# Patient Record
Sex: MALE | Race: WHITE | Employment: FULL TIME | URBAN - NONMETROPOLITAN AREA
[De-identification: names, ages, dates, MRNs, and addresses within clinical notes are randomized per-mention and may not be internally consistent; named-entity substitution may affect disease eponyms.]

---

## 2016-06-17 LAB
BASOPHILS ABSOLUTE: ABNORMAL /ΜL
BASOPHILS RELATIVE PERCENT: ABNORMAL %
CHOLESTEROL, FASTING: 127
EOSINOPHILS ABSOLUTE: ABNORMAL /ΜL
EOSINOPHILS RELATIVE PERCENT: ABNORMAL %
HCT VFR BLD CALC: 33.6 % (ref 41–53)
HDLC SERPL-MCNC: 36 MG/DL (ref 35–70)
HEMOGLOBIN: 10.3 G/DL (ref 13.5–17.5)
LDL CHOLESTEROL CALCULATED: 52 MG/DL (ref 0–160)
LYMPHOCYTES ABSOLUTE: ABNORMAL /ΜL
LYMPHOCYTES RELATIVE PERCENT: ABNORMAL %
MCH RBC QN AUTO: ABNORMAL PG
MCHC RBC AUTO-ENTMCNC: ABNORMAL G/DL
MCV RBC AUTO: ABNORMAL FL
MONOCYTES ABSOLUTE: ABNORMAL /ΜL
MONOCYTES RELATIVE PERCENT: ABNORMAL %
NEUTROPHILS ABSOLUTE: ABNORMAL /ΜL
NEUTROPHILS RELATIVE PERCENT: ABNORMAL %
PLATELET # BLD: 388 K/ΜL
PMV BLD AUTO: ABNORMAL FL
RBC # BLD: ABNORMAL 10^6/ΜL
TRIGLYCERIDE, FASTING: 194
WBC # BLD: 6.9 10^3/ML

## 2016-09-17 LAB — GLUCOSE BLD-MCNC: 130 MG/DL

## 2016-09-19 LAB
CHOLESTEROL, TOTAL: 127 MG/DL
CHOLESTEROL/HDL RATIO: 3.5
HDLC SERPL-MCNC: 36 MG/DL (ref 35–70)
LDL CHOLESTEROL CALCULATED: 52 MG/DL (ref 0–160)
TRIGL SERPL-MCNC: 194 MG/DL
VLDLC SERPL CALC-MCNC: 39 MG/DL

## 2017-04-19 LAB
BUN BLDV-MCNC: NORMAL MG/DL
CHLORIDE: NORMAL
CHLORIDE: NORMAL
CREATININE: NORMAL MG/DL
GFR CALCULATED: NORMAL
HBA1C MFR BLD: NORMAL %
POTASSIUM: NORMAL
SODIUM: NORMAL

## 2017-05-10 RX ORDER — LISINOPRIL AND HYDROCHLOROTHIAZIDE 12.5; 1 MG/1; MG/1
1 TABLET ORAL DAILY
COMMUNITY
End: 2017-05-10 | Stop reason: SDUPTHER

## 2017-05-10 RX ORDER — LISINOPRIL AND HYDROCHLOROTHIAZIDE 12.5; 1 MG/1; MG/1
1 TABLET ORAL DAILY
Qty: 30 TABLET | Refills: 5 | Status: SHIPPED | OUTPATIENT
Start: 2017-05-10 | End: 2017-05-24 | Stop reason: SDUPTHER

## 2017-05-10 RX ORDER — LEVOTHYROXINE SODIUM 175 UG/1
175 TABLET ORAL DAILY
Qty: 30 TABLET | Refills: 5 | Status: SHIPPED | OUTPATIENT
Start: 2017-05-10 | End: 2017-05-24 | Stop reason: SDUPTHER

## 2017-05-10 RX ORDER — LEVOTHYROXINE SODIUM 175 UG/1
175 TABLET ORAL DAILY
COMMUNITY
End: 2017-05-10 | Stop reason: SDUPTHER

## 2017-05-19 DIAGNOSIS — E78.6 HDL DEFICIENCY: ICD-10-CM

## 2017-05-19 DIAGNOSIS — Z72.0 TOBACCO ABUSE: ICD-10-CM

## 2017-05-19 PROBLEM — E78.5 HYPERLIPIDEMIA: Status: ACTIVE | Noted: 2017-05-19

## 2017-05-19 PROBLEM — I10 HTN (HYPERTENSION): Status: ACTIVE | Noted: 2017-05-19

## 2017-05-19 PROBLEM — H60.90 OTITIS EXTERNA: Status: ACTIVE | Noted: 2017-05-19

## 2017-05-19 PROBLEM — E03.9 HYPOTHYROID: Status: ACTIVE | Noted: 2017-05-19

## 2017-05-19 PROBLEM — E78.1 HYPERTRIGLYCERIDEMIA: Status: ACTIVE | Noted: 2017-05-19

## 2017-05-19 PROBLEM — E11.9 DIABETES (HCC): Status: ACTIVE | Noted: 2017-05-19

## 2017-05-19 PROBLEM — K59.00 CONSTIPATION: Status: ACTIVE | Noted: 2017-05-19

## 2017-05-19 RX ORDER — PREDNISONE 1 MG/1
1 TABLET ORAL DAILY
COMMUNITY
End: 2019-10-14 | Stop reason: ALTCHOICE

## 2017-05-19 RX ORDER — POLYETHYLENE GLYCOL 3350 17 G/17G
17 POWDER, FOR SOLUTION ORAL DAILY
COMMUNITY
End: 2017-05-24 | Stop reason: ALTCHOICE

## 2017-05-19 RX ORDER — DULOXETIN HYDROCHLORIDE 60 MG/1
60 CAPSULE, DELAYED RELEASE ORAL DAILY
COMMUNITY
End: 2019-10-14 | Stop reason: ALTCHOICE

## 2017-05-19 RX ORDER — ATORVASTATIN CALCIUM 20 MG/1
20 TABLET, FILM COATED ORAL DAILY
COMMUNITY
End: 2017-05-24 | Stop reason: SDUPTHER

## 2017-05-24 ENCOUNTER — OFFICE VISIT (OUTPATIENT)
Dept: FAMILY MEDICINE CLINIC | Age: 62
End: 2017-05-24
Payer: COMMERCIAL

## 2017-05-24 VITALS — WEIGHT: 233 LBS | DIASTOLIC BLOOD PRESSURE: 80 MMHG | HEART RATE: 88 BPM | SYSTOLIC BLOOD PRESSURE: 120 MMHG

## 2017-05-24 DIAGNOSIS — E11.9 TYPE 2 DIABETES MELLITUS WITHOUT COMPLICATION, WITHOUT LONG-TERM CURRENT USE OF INSULIN (HCC): ICD-10-CM

## 2017-05-24 DIAGNOSIS — E03.9 ACQUIRED HYPOTHYROIDISM: ICD-10-CM

## 2017-05-24 DIAGNOSIS — I10 ESSENTIAL HYPERTENSION: Primary | ICD-10-CM

## 2017-05-24 PROCEDURE — 99214 OFFICE O/P EST MOD 30 MIN: CPT | Performed by: FAMILY MEDICINE

## 2017-05-24 RX ORDER — LEVOTHYROXINE SODIUM 175 UG/1
175 TABLET ORAL DAILY
Qty: 90 TABLET | Refills: 1 | Status: SHIPPED | OUTPATIENT
Start: 2017-05-24 | End: 2018-05-10 | Stop reason: SDUPTHER

## 2017-05-24 RX ORDER — LISINOPRIL AND HYDROCHLOROTHIAZIDE 12.5; 1 MG/1; MG/1
1 TABLET ORAL DAILY
Qty: 90 TABLET | Refills: 1 | Status: SHIPPED | OUTPATIENT
Start: 2017-05-24 | End: 2018-05-10 | Stop reason: SDUPTHER

## 2017-05-24 RX ORDER — ATORVASTATIN CALCIUM 20 MG/1
20 TABLET, FILM COATED ORAL DAILY
Qty: 90 TABLET | Refills: 1 | Status: SHIPPED | OUTPATIENT
Start: 2017-05-24 | End: 2018-03-28 | Stop reason: ALTCHOICE

## 2017-05-24 ASSESSMENT — ENCOUNTER SYMPTOMS
DIARRHEA: 0
SHORTNESS OF BREATH: 0
CONSTIPATION: 0

## 2017-05-24 ASSESSMENT — PATIENT HEALTH QUESTIONNAIRE - PHQ9
2. FEELING DOWN, DEPRESSED OR HOPELESS: 0
1. LITTLE INTEREST OR PLEASURE IN DOING THINGS: 0
SUM OF ALL RESPONSES TO PHQ QUESTIONS 1-9: 0
SUM OF ALL RESPONSES TO PHQ9 QUESTIONS 1 & 2: 0

## 2017-11-22 DIAGNOSIS — I10 ESSENTIAL HYPERTENSION: ICD-10-CM

## 2017-11-22 DIAGNOSIS — E03.9 ACQUIRED HYPOTHYROIDISM: ICD-10-CM

## 2017-11-22 DIAGNOSIS — E11.9 TYPE 2 DIABETES MELLITUS WITHOUT COMPLICATION, WITHOUT LONG-TERM CURRENT USE OF INSULIN (HCC): ICD-10-CM

## 2017-11-22 LAB
AGE FOR GFR: 61
ANION GAP SERPL CALCULATED.3IONS-SCNC: 12 MMOL/L
CHLORIDE BLD-SCNC: 102 MMOL/L
CO2: 30 MMOL/L
CREAT SERPL-MCNC: 1.2 MG/DL
CREAT SERPL-MCNC: 1.2 MG/DL
CREATININE, RANDOM: 150.7 MG/DL
EGFR BF: 55 ML/MIN/1.73 M2
EGFR BM: 75 ML/MIN/1.73 M2
EGFR WF: 46 ML/MIN/1.73 M2
EGFR WM: 62 ML/MIN/1.73 M2
MICROALBUMIN UR-MCNC: <0.6 MG/DL
POTASSIUM SERPL-SCNC: 4.8 MMOL/L
SODIUM BLD-SCNC: 139 MMOL/L
TSH SERPL DL<=0.05 MIU/L-ACNC: 1.37 MIU/ML
TSH SERPL DL<=0.05 MIU/L-ACNC: 1.37 UIU/ML

## 2017-11-29 ENCOUNTER — OFFICE VISIT (OUTPATIENT)
Dept: FAMILY MEDICINE CLINIC | Age: 62
End: 2017-11-29
Payer: COMMERCIAL

## 2017-11-29 VITALS — HEART RATE: 84 BPM | DIASTOLIC BLOOD PRESSURE: 70 MMHG | SYSTOLIC BLOOD PRESSURE: 120 MMHG | WEIGHT: 241 LBS

## 2017-11-29 DIAGNOSIS — Z23 NEED FOR VACCINATION: ICD-10-CM

## 2017-11-29 DIAGNOSIS — I10 ESSENTIAL HYPERTENSION: ICD-10-CM

## 2017-11-29 DIAGNOSIS — E11.9 TYPE 2 DIABETES MELLITUS WITHOUT COMPLICATION, WITHOUT LONG-TERM CURRENT USE OF INSULIN (HCC): Primary | ICD-10-CM

## 2017-11-29 DIAGNOSIS — E78.6 HDL DEFICIENCY: ICD-10-CM

## 2017-11-29 DIAGNOSIS — E03.9 ACQUIRED HYPOTHYROIDISM: ICD-10-CM

## 2017-11-29 DIAGNOSIS — Z11.59 NEED FOR HEPATITIS C SCREENING TEST: ICD-10-CM

## 2017-11-29 DIAGNOSIS — Z72.0 TOBACCO ABUSE: ICD-10-CM

## 2017-11-29 LAB — HBA1C MFR BLD: 6.8 %

## 2017-11-29 PROCEDURE — 90686 IIV4 VACC NO PRSV 0.5 ML IM: CPT | Performed by: FAMILY MEDICINE

## 2017-11-29 PROCEDURE — 83036 HEMOGLOBIN GLYCOSYLATED A1C: CPT | Performed by: FAMILY MEDICINE

## 2017-11-29 PROCEDURE — 99214 OFFICE O/P EST MOD 30 MIN: CPT | Performed by: FAMILY MEDICINE

## 2017-11-29 PROCEDURE — 90471 IMMUNIZATION ADMIN: CPT | Performed by: FAMILY MEDICINE

## 2017-11-29 ASSESSMENT — ENCOUNTER SYMPTOMS: SHORTNESS OF BREATH: 1

## 2017-11-29 NOTE — PROGRESS NOTES
[facility]  [address]  Dept: [phone]  Dept Fax: [phone]    Kat Pop is a 65 y.o. male who presents today for his medical conditions/complaints as noted below.   Kat Pop is c/o of Hypertension; Diabetes; and Hyperlipidemia            HPI:     HPI    Here for follow up on HTN, DM, lipid  Not feeling like doing as good as may      BP Readings from Last 3 Encounters:   11/29/17 120/70   05/24/17 120/80          (goal 120/80)    Past Medical History:   Diagnosis Date    Hyperlipidemia     Hypothyroidism     Polymyalgia rheumatica (HCC)     Shingles     Shingles       Past Surgical History:   Procedure Laterality Date    BACK SURGERY      x4    CARPAL TUNNEL RELEASE Bilateral     CATARACT REMOVAL WITH IMPLANT Bilateral 10/2017    COLONOSCOPY      EYE SURGERY  09/2017    Cataract    EYE SURGERY  10/2017    Cataract    HERNIA REPAIR      baby    LAMINECTOMY  08/16/2016    decompression    TONSILLECTOMY      TUMOR REMOVAL      lt arm       Family History   Problem Relation Age of Onset    Cancer Father      brain tumor    Diabetes Sister        Social History   Substance Use Topics    Smoking status: Current Every Day Smoker     Packs/day: 0.50     Types: Cigarettes     Start date: 8/1/2017    Smokeless tobacco: Never Used    Alcohol use Not on file      Current Outpatient Prescriptions   Medication Sig Dispense Refill    levothyroxine (SYNTHROID) 175 MCG tablet Take 1 tablet by mouth Daily 90 tablet 1    lisinopril-hydrochlorothiazide (PRINZIDE;ZESTORETIC) 10-12.5 MG per tablet Take 1 tablet by mouth daily 90 tablet 1    atorvastatin (LIPITOR) 20 MG tablet Take 1 tablet by mouth daily 90 tablet 1    DULoxetine (CYMBALTA) 60 MG extended release capsule Take 60 mg by mouth daily      predniSONE (DELTASONE) 1 MG tablet Take 1 mg by mouth daily 2 tabs       No current facility-administered medications for this visit. Allergies   Allergen Reactions    Morphine      Burning,tingling         Health Maintenance   Topic Date Due    Hepatitis C screen  1955    Diabetic foot exam  01/06/1966    Diabetic retinal exam  01/06/1966    HIV screen  01/06/1971    DTaP/Tdap/Td vaccine (1 - Tdap) 01/06/1975    Zostavax vaccine  01/06/2016    Lipid screen  09/16/2017    Diabetic hemoglobin A1C test  04/19/2018    Diabetic microalbuminuria test  11/22/2018    Colon cancer screen colonoscopy  09/21/2025    Flu vaccine  Completed    Pneumococcal med risk  Completed       Subjective:      Review of Systems   Constitutional: Negative for chills and unexpected weight change (gained). Eyes: Negative for visual disturbance (eye appt in Aug, had cataracts removed in Sept/OCt==ct). Respiratory: Positive for shortness of breath (noticed when walking quickly). Cardiovascular: Positive for leg swelling (at night). Negative for chest pain and palpitations. Genitourinary: Negative for frequency. Neurological: Negative for dizziness. Blood Sugar Checks? no  Medication Compliant? yes  Blood Pressure Checks? Yes, @ 2 times a week    Objective:     /70   Pulse 84   Wt 241 lb (109.3 kg)   Physical Exam   Constitutional: He is oriented to person, place, and time. He appears well-developed and well-nourished. obese   Neck: Neck supple. No thyromegaly present. Cardiovascular: Normal rate. No murmur heard. Pulmonary/Chest: Effort normal. No respiratory distress. He has no wheezes. Abdominal: Bowel sounds are normal.   Musculoskeletal: He exhibits no edema. Lymphadenopathy:     He has no cervical adenopathy. Neurological: He is alert and oriented to person, place, and time. Vitals reviewed. No results found for: LABA1C  No results found for: EAG  Diabetic foot exam - normal strength; intact sensation to 10 gm monofilament; normal pulses, no ulcerations or callouses. Good capillary refill. Toenails unremarkable. Weight gain 8 #   Assessment:      1. Need for vaccination    2. HDL deficiency    3. Essential hypertension    4. Type 2 diabetes mellitus without complication, without long-term current use of insulin ([address]    5. Tobacco abuse    6. Acquired hypothyroidism    7. Need for hepatitis C screening test                     Plan:     There are no Patient Instructions on file for this visit. Orders Placed This Encounter   Procedures    INFLUENZA, QUADV, 3 YRS AND OLDER, IM, PF, PREFILL SYR OR SDV, 0.5ML (FLUZONE QUADV, PF)    Hepatitis C Antibody     Standing Status:   Future     Standing Expiration Date:   11/29/2018    Lipid Panel     Standing Status:   Future     Standing Expiration Date:   11/29/2018     Order Specific Question:   Is Patient Fasting?/# of Hours     Answer:   10-12    Creatinine, Serum     Standing Status:   Future     Standing Expiration Date:   11/29/2018    Electrolyte Panel     Standing Status:   Future     Standing Expiration Date:   11/29/2018    POCT glycosylated hemoglobin (Hb A1C)    HM DIABETES FOOT EXAM     No orders of the defined types were placed in this encounter. Return in about 4 months (around 3/29/2018). 4 month follow up if wt down and sugars better will hold Rx for DM. Pt aware. Discussed use, benefit, and side effects of prescribed medications. All patient questions answered. Pt voiced understanding. Reviewed health maintenance. Instructed to continue current medications, diet and exercise. Patient agreed with treatment plan. Follow up as directed.      Electronically signed by Avery Basilio MD on 11/29/2017

## 2018-03-27 LAB
AGE FOR GFR: 62
ANION GAP SERPL CALCULATED.3IONS-SCNC: 13 MMOL/L
CHLORIDE BLD-SCNC: 101 MMOL/L
CHOLESTEROL/HDL RATIO: 4 RATIO
CHOLESTEROL: 156 MG/DL
CO2: 31 MMOL/L
CREAT SERPL-MCNC: 1 MG/DL
EGFR BF: 68 ML/MIN/1.73 M2
EGFR BM: 92 ML/MIN/1.73 M2
EGFR WF: 56 ML/MIN/1.73 M2
EGFR WM: 76 ML/MIN/1.73 M2
HDL, DIRECT: 39 MG/DL
HEPATITIS C IGG: NORMAL
LDL CHOLESTEROL CALCULATED: 61.8 MG/DL
POTASSIUM SERPL-SCNC: 4.3 MMOL/L
SIGNAL/CUTOFF: NORMAL
SODIUM BLD-SCNC: 141 MMOL/L
TRIGL SERPL-MCNC: 276 MG/DL
VLDLC SERPL CALC-MCNC: 55 MG/DL

## 2018-03-28 ENCOUNTER — OFFICE VISIT (OUTPATIENT)
Dept: FAMILY MEDICINE CLINIC | Age: 63
End: 2018-03-28
Payer: COMMERCIAL

## 2018-03-28 VITALS
HEIGHT: 66 IN | WEIGHT: 234 LBS | BODY MASS INDEX: 37.61 KG/M2 | DIASTOLIC BLOOD PRESSURE: 62 MMHG | SYSTOLIC BLOOD PRESSURE: 128 MMHG

## 2018-03-28 DIAGNOSIS — Z11.59 NEED FOR HEPATITIS C SCREENING TEST: ICD-10-CM

## 2018-03-28 DIAGNOSIS — E78.6 HDL DEFICIENCY: ICD-10-CM

## 2018-03-28 DIAGNOSIS — I10 ESSENTIAL HYPERTENSION: ICD-10-CM

## 2018-03-28 DIAGNOSIS — H61.23 CERUMEN DEBRIS ON TYMPANIC MEMBRANE OF BOTH EARS: ICD-10-CM

## 2018-03-28 DIAGNOSIS — E11.9 TYPE 2 DIABETES MELLITUS WITHOUT COMPLICATION, WITHOUT LONG-TERM CURRENT USE OF INSULIN (HCC): Primary | ICD-10-CM

## 2018-03-28 DIAGNOSIS — E03.9 ACQUIRED HYPOTHYROIDISM: ICD-10-CM

## 2018-03-28 DIAGNOSIS — H91.93 DECREASED HEARING OF BOTH EARS: ICD-10-CM

## 2018-03-28 DIAGNOSIS — I73.9 PERIPHERAL VASCULAR DISEASE (HCC): ICD-10-CM

## 2018-03-28 LAB
ANTIBODY: 0.01
HBA1C MFR BLD: 7.2 %

## 2018-03-28 PROCEDURE — 83036 HEMOGLOBIN GLYCOSYLATED A1C: CPT | Performed by: FAMILY MEDICINE

## 2018-03-28 PROCEDURE — 99214 OFFICE O/P EST MOD 30 MIN: CPT | Performed by: FAMILY MEDICINE

## 2018-03-28 RX ORDER — ROSUVASTATIN CALCIUM 20 MG/1
20 TABLET, COATED ORAL DAILY
Qty: 90 TABLET | Refills: 1 | Status: SHIPPED | OUTPATIENT
Start: 2018-03-28 | End: 2018-11-23 | Stop reason: SDUPTHER

## 2018-03-28 RX ORDER — ASPIRIN 81 MG/1
81 TABLET, CHEWABLE ORAL DAILY
Qty: 30 TABLET | Refills: 3 | Status: SHIPPED | OUTPATIENT
Start: 2018-03-28 | End: 2018-09-06 | Stop reason: SDUPTHER

## 2018-03-28 RX ORDER — CETIRIZINE HYDROCHLORIDE 10 MG/1
10 TABLET ORAL DAILY
COMMUNITY
End: 2019-10-14 | Stop reason: ALTCHOICE

## 2018-03-28 RX ORDER — LORATADINE 10 MG/1
10 TABLET ORAL DAILY
COMMUNITY
End: 2018-08-22 | Stop reason: ALTCHOICE

## 2018-03-28 ASSESSMENT — ENCOUNTER SYMPTOMS: SHORTNESS OF BREATH: 0

## 2018-03-28 NOTE — PROGRESS NOTES
[facility]  [address] [facility] [address]  Dept: [phone]  Dept Fax: [phone]    Christy Matthews is a 59 y.o. male who presents today for his medical conditions/complaints as noted below. Christy Matthews is c/o of 3 Month Follow-Up; Diabetes; and Hypertension            HPI:     HPI   Here for follow up for DM and HTN. Not monitoring closely on diet  Taking medication regularly  No SE's noted    No new concerns.     BP Readings from Last 3 Encounters:   03/28/18 128/62   11/29/17 120/70   05/24/17 120/80          (goal 120/80)    Past Medical History:   Diagnosis Date    Hyperlipidemia     Hypothyroidism     Polymyalgia rheumatica (HCC)     Shingles     Shingles       Past Surgical History:   Procedure Laterality Date    BACK SURGERY      x4    CARPAL TUNNEL RELEASE Bilateral     CATARACT REMOVAL WITH IMPLANT Bilateral 10/2017    COLONOSCOPY      EYE SURGERY  09/2017    Cataract    EYE SURGERY  10/2017    Cataract    HERNIA REPAIR      baby    LAMINECTOMY  08/16/2016    decompression    TONSILLECTOMY      TUMOR REMOVAL      lt arm       Family History   Problem Relation Age of Onset    Cancer Father      brain tumor    Diabetes Sister        Social History   Substance Use Topics    Smoking status: Current Every Day Smoker     Packs/day: 0.50     Types: Cigarettes     Start date: 8/1/2017    Smokeless tobacco: Never Used    Alcohol use Not on file      Current Outpatient Prescriptions   Medication Sig Dispense Refill    loratadine (CLEAR-ATADINE) 10 MG tablet Take 10 mg by mouth daily      cetirizine (ZYRTEC) 10 MG tablet Take 10 mg by mouth daily      rosuvastatin (CRESTOR) 20 MG tablet Take 1 tablet by mouth daily 90 tablet 1    aspirin (ASPIRIN CHILDRENS) 81 MG chewable tablet Take 1 tablet by mouth daily 30 tablet 3    levothyroxine (SYNTHROID) 175 MCG tablet Take 1 tablet by mouth Daily 90 tablet 1   

## 2018-04-18 ENCOUNTER — OFFICE VISIT (OUTPATIENT)
Dept: FAMILY MEDICINE CLINIC | Age: 63
End: 2018-04-18
Payer: COMMERCIAL

## 2018-04-18 VITALS
TEMPERATURE: 98.7 F | DIASTOLIC BLOOD PRESSURE: 72 MMHG | HEART RATE: 106 BPM | WEIGHT: 235 LBS | SYSTOLIC BLOOD PRESSURE: 126 MMHG | BODY MASS INDEX: 37.93 KG/M2 | RESPIRATION RATE: 16 BRPM | OXYGEN SATURATION: 95 %

## 2018-04-18 DIAGNOSIS — R10.9 RIGHT FLANK PAIN: Primary | ICD-10-CM

## 2018-04-18 LAB
BILIRUBIN, POC: NORMAL
BLOOD URINE, POC: NORMAL
CLARITY, POC: NORMAL
COLOR, POC: NORMAL
GLUCOSE URINE, POC: 250
KETONES, POC: NORMAL
LEUKOCYTE EST, POC: NORMAL
NITRITE, POC: NORMAL
PH, POC: 6.5
PROTEIN, POC: NORMAL
SPECIFIC GRAVITY, POC: 1.01
UROBILINOGEN, POC: 0.2

## 2018-04-18 PROCEDURE — 81002 URINALYSIS NONAUTO W/O SCOPE: CPT | Performed by: NURSE PRACTITIONER

## 2018-04-18 PROCEDURE — 99214 OFFICE O/P EST MOD 30 MIN: CPT | Performed by: NURSE PRACTITIONER

## 2018-04-18 ASSESSMENT — ENCOUNTER SYMPTOMS
BOWEL INCONTINENCE: 0
ABDOMINAL PAIN: 0

## 2018-04-24 ENCOUNTER — TELEPHONE (OUTPATIENT)
Dept: FAMILY MEDICINE CLINIC | Age: 63
End: 2018-04-24

## 2018-04-24 DIAGNOSIS — R10.11 RIGHT UPPER QUADRANT ABDOMINAL PAIN: ICD-10-CM

## 2018-04-24 DIAGNOSIS — K82.0 CONTRACTION, GALLBLADDER: Primary | ICD-10-CM

## 2018-04-25 ENCOUNTER — TELEPHONE (OUTPATIENT)
Dept: FAMILY MEDICINE CLINIC | Age: 63
End: 2018-04-25

## 2018-04-25 LAB
A/G RATIO: 1.5 RATIO
AGE FOR GFR: 62
ALBUMIN: 3.7 G/DL
ALK PHOSPHATASE: 51 UNITS/L
ALT SERPL-CCNC: 18 UNITS/L
AMYLASE: 69 UNITS/L
ANION GAP SERPL CALCULATED.3IONS-SCNC: 16 MMOL/L
AST SERPL-CCNC: 12 UNITS/L
BASOPHILS # BLD: 0.15 THOU/MM3
BILIRUB SERPL-MCNC: 0.6 MG/DL
BUN BLDV-MCNC: 21 MG/DL
CALCIUM SERPL-MCNC: 9.7 MG/DL
CHLORIDE BLD-SCNC: 102 MMOL/L
CO2: 28 MMOL/L
CREAT SERPL-MCNC: 1 MG/DL
DIFFERENTIAL: AUTOMATED DIFF
EGFR BF: 68 ML/MIN/1.73 M2
EGFR BM: 92 ML/MIN/1.73 M2
EGFR WF: 56 ML/MIN/1.73 M2
EGFR WM: 76 ML/MIN/1.73 M2
EOSINOPHIL # BLD: 0.49 THOU/MM3
GLOBULIN: 2.4 G/DL
GLUCOSE: 161 MG/DL
HCT VFR BLD CALC: 44 %
HEMOGLOBIN: 13.7 G/DL
LIPASE: 389 UNITS/L
LYMPHOCYTES # BLD: 1.8 THOU/MM3
MCH RBC QN AUTO: 28 PG
MCHC RBC AUTO-ENTMCNC: 31.2 G/DL
MCV RBC AUTO: 89.9 FL
MONOCYTES # BLD: 0.45 THOU/MM3
NEUTROPHILS: 4.47 THOU/MM3
PDW BLD-RTO: 13.3 %
PLATELET # BLD: 269 THOU/MM3
PMV BLD AUTO: 9 FL
POTASSIUM SERPL-SCNC: 4.4 MMOL/L
RBC # BLD: 4.9 M/UL
SODIUM BLD-SCNC: 142 MMOL/L
TOTAL PROTEIN: 6.1 G/DL
WBC # BLD: 7.36 THOU/ML3

## 2018-05-01 ENCOUNTER — INITIAL CONSULT (OUTPATIENT)
Dept: SURGERY | Age: 63
End: 2018-05-01
Payer: COMMERCIAL

## 2018-05-01 VITALS
DIASTOLIC BLOOD PRESSURE: 60 MMHG | BODY MASS INDEX: 37.28 KG/M2 | WEIGHT: 232 LBS | TEMPERATURE: 98.2 F | SYSTOLIC BLOOD PRESSURE: 114 MMHG | HEART RATE: 96 BPM | RESPIRATION RATE: 16 BRPM | HEIGHT: 66 IN

## 2018-05-01 DIAGNOSIS — R10.9 RIGHT FLANK PAIN: Primary | ICD-10-CM

## 2018-05-01 PROCEDURE — 99203 OFFICE O/P NEW LOW 30 MIN: CPT | Performed by: SURGERY

## 2018-05-01 RX ORDER — POLYETHYLENE GLYCOL 3350 17 G/17G
17 POWDER, FOR SOLUTION ORAL DAILY
COMMUNITY

## 2018-05-01 RX ORDER — IBUPROFEN 200 MG
200 TABLET ORAL EVERY 6 HOURS PRN
COMMUNITY
End: 2018-08-22 | Stop reason: ALTCHOICE

## 2018-05-01 ASSESSMENT — ENCOUNTER SYMPTOMS
SORE THROAT: 0
VOMITING: 0
SHORTNESS OF BREATH: 0
CONSTIPATION: 0
HEARTBURN: 0
ABDOMINAL PAIN: 0
DIARRHEA: 0
STRIDOR: 0
BACK PAIN: 1
SINUS PAIN: 0
WHEEZING: 0
COUGH: 0
EYES NEGATIVE: 1
NAUSEA: 0

## 2018-05-10 DIAGNOSIS — E03.9 ACQUIRED HYPOTHYROIDISM: ICD-10-CM

## 2018-05-10 DIAGNOSIS — I10 ESSENTIAL HYPERTENSION: ICD-10-CM

## 2018-05-11 RX ORDER — LEVOTHYROXINE SODIUM 175 UG/1
TABLET ORAL
Qty: 90 TABLET | Refills: 1 | Status: SHIPPED | OUTPATIENT
Start: 2018-05-11 | End: 2018-08-22 | Stop reason: DRUGHIGH

## 2018-05-11 RX ORDER — LISINOPRIL AND HYDROCHLOROTHIAZIDE 12.5; 1 MG/1; MG/1
TABLET ORAL
Qty: 90 TABLET | Refills: 1 | Status: SHIPPED | OUTPATIENT
Start: 2018-05-11 | End: 2018-11-04 | Stop reason: SDUPTHER

## 2018-08-21 LAB
A/G RATIO: 1.7 RATIO
AGE FOR GFR: 62
ALBUMIN: 3.8 G/DL
ALK PHOSPHATASE: 52 UNITS/L
ALT SERPL-CCNC: 28 UNITS/L
ANION GAP SERPL CALCULATED.3IONS-SCNC: 10 MMOL/L
AST SERPL-CCNC: 15 UNITS/L
BILIRUB SERPL-MCNC: 0.4 MG/DL
BILIRUBIN DIRECT: 0 MG/DL
CHLORIDE BLD-SCNC: 102 MMOL/L
CHOLESTEROL/HDL RATIO: 3.1 RATIO
CHOLESTEROL: 136 MG/DL
CO2: 32 MMOL/L
CREAT SERPL-MCNC: 1.1 MG/DL
EGFR BF: 61 ML/MIN/1.73 M2
EGFR BM: 82 ML/MIN/1.73 M2
EGFR WF: 50 ML/MIN/1.73 M2
EGFR WM: 68 ML/MIN/1.73 M2
GLOBULIN: 2.3 G/DL
HDL, DIRECT: 44 MG/DL
LDL CHOLESTEROL CALCULATED: 34 MG/DL
POTASSIUM SERPL-SCNC: 4.7 MMOL/L
SODIUM BLD-SCNC: 139 MMOL/L
TOTAL PROTEIN: 6.1 G/DL
TRIGL SERPL-MCNC: 290 MG/DL
TSH SERPL DL<=0.05 MIU/L-ACNC: 7.79 MIU/ML
VLDLC SERPL CALC-MCNC: 58 MG/DL

## 2018-08-22 ENCOUNTER — OFFICE VISIT (OUTPATIENT)
Dept: FAMILY MEDICINE CLINIC | Age: 63
End: 2018-08-22
Payer: COMMERCIAL

## 2018-08-22 VITALS
HEART RATE: 80 BPM | WEIGHT: 234 LBS | DIASTOLIC BLOOD PRESSURE: 70 MMHG | SYSTOLIC BLOOD PRESSURE: 130 MMHG | BODY MASS INDEX: 37.77 KG/M2

## 2018-08-22 DIAGNOSIS — E78.6 HDL DEFICIENCY: ICD-10-CM

## 2018-08-22 DIAGNOSIS — E11.9 TYPE 2 DIABETES MELLITUS WITHOUT COMPLICATION, WITHOUT LONG-TERM CURRENT USE OF INSULIN (HCC): Primary | ICD-10-CM

## 2018-08-22 DIAGNOSIS — I73.9 PERIPHERAL VASCULAR DISEASE (HCC): ICD-10-CM

## 2018-08-22 DIAGNOSIS — Z72.0 TOBACCO ABUSE: ICD-10-CM

## 2018-08-22 DIAGNOSIS — I10 ESSENTIAL HYPERTENSION: ICD-10-CM

## 2018-08-22 DIAGNOSIS — E78.1 HYPERTRIGLYCERIDEMIA: ICD-10-CM

## 2018-08-22 DIAGNOSIS — E03.9 ACQUIRED HYPOTHYROIDISM: ICD-10-CM

## 2018-08-22 LAB — HBA1C MFR BLD: 7.2 %

## 2018-08-22 PROCEDURE — 99214 OFFICE O/P EST MOD 30 MIN: CPT | Performed by: FAMILY MEDICINE

## 2018-08-22 PROCEDURE — 83036 HEMOGLOBIN GLYCOSYLATED A1C: CPT | Performed by: FAMILY MEDICINE

## 2018-08-22 RX ORDER — LEVOTHYROXINE SODIUM 0.2 MG/1
200 TABLET ORAL DAILY
Qty: 90 TABLET | Refills: 1 | Status: SHIPPED | OUTPATIENT
Start: 2018-08-22 | End: 2019-03-10 | Stop reason: SDUPTHER

## 2018-08-22 ASSESSMENT — PATIENT HEALTH QUESTIONNAIRE - PHQ9
SUM OF ALL RESPONSES TO PHQ9 QUESTIONS 1 & 2: 0
SUM OF ALL RESPONSES TO PHQ QUESTIONS 1-9: 0
SUM OF ALL RESPONSES TO PHQ QUESTIONS 1-9: 0
2. FEELING DOWN, DEPRESSED OR HOPELESS: 0
1. LITTLE INTEREST OR PLEASURE IN DOING THINGS: 0

## 2018-08-22 ASSESSMENT — ENCOUNTER SYMPTOMS: SHORTNESS OF BREATH: 0

## 2018-08-22 NOTE — PROGRESS NOTES
Checks? no  Medication Compliant? yes  Blood Pressure Checks? Yes weekly, runs ok    Objective:     /70   Pulse 80   Wt 234 lb (106.1 kg)   BMI 37.77 kg/m²   Physical Exam   Constitutional: He is oriented to person, place, and time. He appears well-developed and well-nourished. No distress. HENT:   Head: Atraumatic. Eyes: Conjunctivae are normal.   Neck: Neck supple. Carotid bruit is not present. No thyromegaly present. Cardiovascular: Normal rate and regular rhythm. No murmur heard. Pulmonary/Chest: Effort normal and breath sounds normal.   Musculoskeletal: He exhibits tenderness ( left upper knee cap only spot tenderness). He exhibits no edema. Right lower leg: He exhibits no swelling. Left lower leg: He exhibits no swelling. Lymphadenopathy:     He has no cervical adenopathy. Neurological: He is alert and oriented to person, place, and time. Vitals reviewed. Lab Results   Component Value Date    LABA1C 7.2 08/22/2018     No results found for: EAG    A1C 7.2 same as prior  Lab Results   Component Value Date     08/21/2018    K 4.7 08/21/2018     08/21/2018    CO2 32 (H) 08/21/2018     Lab Results   Component Value Date    CREATININE 1.1 08/21/2018     Lab Results   Component Value Date    CHOL 136 08/21/2018    CHOL 156 03/27/2018    CHOL 127 09/16/2016     Lab Results   Component Value Date    TRIG 290 (H) 08/21/2018    TRIG 276 (H) 03/27/2018    TRIG 194 09/16/2016     Lab Results   Component Value Date    HDL 36 09/16/2016    HDL 36 06/17/2016     Lab Results   Component Value Date    LDLCALC 34.0 08/21/2018    LDLCALC 61.8 03/27/2018    LDLCALC 52 09/16/2016     Lab Results   Component Value Date    VLDL 58 (H) 08/21/2018    VLDL 55 (H) 03/27/2018    VLDL 39 09/16/2016     Lab Results   Component Value Date    CHOLHDLRATIO 3.1 08/21/2018    CHOLHDLRATIO 4.0 03/27/2018    CHOLHDLRATIO 3.5 09/16/2016   HDL 44 improved from prior.   Lab Results   Component

## 2018-11-04 DIAGNOSIS — I10 ESSENTIAL HYPERTENSION: ICD-10-CM

## 2018-11-05 RX ORDER — LISINOPRIL AND HYDROCHLOROTHIAZIDE 12.5; 1 MG/1; MG/1
TABLET ORAL
Qty: 90 TABLET | Refills: 1 | Status: SHIPPED | OUTPATIENT
Start: 2018-11-05 | End: 2019-05-21 | Stop reason: SDUPTHER

## 2018-11-26 RX ORDER — ROSUVASTATIN CALCIUM 20 MG/1
TABLET, COATED ORAL
Qty: 90 TABLET | Refills: 1 | Status: SHIPPED | OUTPATIENT
Start: 2018-11-26 | End: 2019-06-04 | Stop reason: SDUPTHER

## 2019-05-28 LAB
AGE FOR GFR: 63
ANION GAP SERPL CALCULATED.3IONS-SCNC: 9 MMOL/L
CHLORIDE BLD-SCNC: 106 MMOL/L (ref 98–120)
CHOLESTEROL/HDL RATIO: 4.9 RATIO (ref 0–4.5)
CHOLESTEROL: 163 MG/DL (ref 50–200)
CO2: 27 MMOL/L (ref 22–31)
CREAT SERPL-MCNC: 1 MG/DL (ref 0.7–1.3)
CREATININE, RANDOM: 138.2 MG/DL (ref 20–370)
EGFR BF: 68 ML/MIN/1.73 M2
EGFR BM: 91 ML/MIN/1.73 M2
EGFR WF: 56 ML/MIN/1.73 M2
EGFR WM: 75 ML/MIN/1.73 M2
HBA1C MFR BLD: 7.1 % (ref 4.4–6.4)
HDL, DIRECT: 33 MG/DL (ref 36–68)
LDL CHOLESTEROL CALCULATED: -52 MG/DL
MICROALBUMIN UR-MCNC: 0.6 MG/DL (ref 0–1.7)
MICROALBUMIN/CREAT UR-RTO: 4.3 MCG/MG CR
POTASSIUM SERPL-SCNC: 4.4 MMOL/L (ref 3.6–5)
SODIUM BLD-SCNC: 138 MMOL/L (ref 135–145)
TRIGL SERPL-MCNC: 910 MG/DL (ref 10–250)
TSH SERPL DL<=0.05 MIU/L-ACNC: 0.52 MIU/ML (ref 0.49–4.6)
VLDLC SERPL CALC-MCNC: 182 MG/DL

## 2019-06-04 ENCOUNTER — OFFICE VISIT (OUTPATIENT)
Dept: FAMILY MEDICINE CLINIC | Age: 64
End: 2019-06-04
Payer: COMMERCIAL

## 2019-06-04 VITALS
DIASTOLIC BLOOD PRESSURE: 78 MMHG | HEART RATE: 80 BPM | WEIGHT: 228 LBS | BODY MASS INDEX: 36.64 KG/M2 | HEIGHT: 66 IN | SYSTOLIC BLOOD PRESSURE: 138 MMHG | OXYGEN SATURATION: 97 %

## 2019-06-04 DIAGNOSIS — Z23 NEED FOR TDAP VACCINATION: ICD-10-CM

## 2019-06-04 DIAGNOSIS — I10 ESSENTIAL HYPERTENSION: ICD-10-CM

## 2019-06-04 DIAGNOSIS — E03.9 ACQUIRED HYPOTHYROIDISM: ICD-10-CM

## 2019-06-04 DIAGNOSIS — L91.8 SKIN TAG: ICD-10-CM

## 2019-06-04 DIAGNOSIS — E78.1 HYPERTRIGLYCERIDEMIA: ICD-10-CM

## 2019-06-04 DIAGNOSIS — E11.9 TYPE 2 DIABETES MELLITUS WITHOUT COMPLICATION, WITHOUT LONG-TERM CURRENT USE OF INSULIN (HCC): ICD-10-CM

## 2019-06-04 DIAGNOSIS — E78.6 HDL DEFICIENCY: ICD-10-CM

## 2019-06-04 DIAGNOSIS — I73.9 PERIPHERAL VASCULAR DISEASE (HCC): Primary | ICD-10-CM

## 2019-06-04 PROCEDURE — 99214 OFFICE O/P EST MOD 30 MIN: CPT | Performed by: FAMILY MEDICINE

## 2019-06-04 PROCEDURE — 90715 TDAP VACCINE 7 YRS/> IM: CPT | Performed by: FAMILY MEDICINE

## 2019-06-04 PROCEDURE — 90471 IMMUNIZATION ADMIN: CPT | Performed by: FAMILY MEDICINE

## 2019-06-04 RX ORDER — LISINOPRIL AND HYDROCHLOROTHIAZIDE 12.5; 1 MG/1; MG/1
TABLET ORAL
Qty: 90 TABLET | Refills: 1 | Status: SHIPPED | OUTPATIENT
Start: 2019-06-04 | End: 2019-10-14 | Stop reason: DRUGHIGH

## 2019-06-04 RX ORDER — ROSUVASTATIN CALCIUM 40 MG/1
40 TABLET, COATED ORAL DAILY
Qty: 90 TABLET | Refills: 3 | Status: SHIPPED | OUTPATIENT
Start: 2019-06-04 | End: 2020-05-18

## 2019-06-04 ASSESSMENT — PATIENT HEALTH QUESTIONNAIRE - PHQ9
SUM OF ALL RESPONSES TO PHQ QUESTIONS 1-9: 0
2. FEELING DOWN, DEPRESSED OR HOPELESS: 0
1. LITTLE INTEREST OR PLEASURE IN DOING THINGS: 0
SUM OF ALL RESPONSES TO PHQ QUESTIONS 1-9: 0
SUM OF ALL RESPONSES TO PHQ9 QUESTIONS 1 & 2: 0

## 2019-06-04 ASSESSMENT — ENCOUNTER SYMPTOMS: SHORTNESS OF BREATH: 0

## 2019-06-04 NOTE — PROGRESS NOTES
[address] [facility]  Dept: [phone]  Dept Fax:[phone]    Amberly Skinner is a 62 y.o. male who presents today for his medical conditions/complaints as noted below. Amberly Skinner is c/o of 6 Month Follow-Up; Hypertension; and Medication Refill    HPI:     HPI  Here for follow up of PVD, HTN, DM, Hyperlipidemia and Hypothyroid  Taking all medications regularly  No side effects noted  Noted higher TG, would like to work weight. Had diet <2000 huyen with lots of fruit and limiting red meat.     No other complaint currently    BP Readings from Last 3 Encounters:   06/04/19 138/78   08/22/18 130/70   05/01/18 114/60          (goal 120/80)    Past Medical History:   Diagnosis Date    Hyperlipidemia     Hypothyroidism     Polymyalgia rheumatica (HCC)     Shingles     Shingles       Past Surgical History:   Procedure Laterality Date    BACK SURGERY      x4    CARPAL TUNNEL RELEASE Bilateral     CATARACT REMOVAL WITH IMPLANT Bilateral 10/2017    COLONOSCOPY      EYE SURGERY  09/2017    Cataract    EYE SURGERY  10/2017    Cataract    HERNIA REPAIR      baby    LAMINECTOMY  08/16/2016    decompression    TONSILLECTOMY      TUMOR REMOVAL      lt arm       Family History   Problem Relation Age of Onset   [facility] Cancer Father         brain tumor    Diabetes Sister        Social History     Tobacco Use    Smoking status: Current Every Day Smoker     Packs/day: 0.50     Years: 40.00     Pack years: 20.00     Types: Cigarettes    Smokeless tobacco: Never Used   Substance Use Topics    Alcohol use: Not on file      Current Outpatient Medications   Medication Sig Dispense Refill    lisinopril-hydrochlorothiazide (PRINZIDE;ZESTORETIC) 10-12.5 MG per tablet TAKE 1 TABLET BY MOUTH ONCE DAILY 90 tablet 1    rosuvastatin (CRESTOR) 40 MG tablet Take 1 tablet by mouth daily 90 tablet 3    levothyroxine (SYNTHROID) 200 MCG tablet TAKE 1 TABLET BY MOUTH ONCE DAILY 90 tablet 1    aspirin 81 MG chewable tablet One dialy 100 tablet 3    polyethylene glycol (GLYCOLAX) powder Take 17 g by mouth daily      cetirizine (ZYRTEC) 10 MG tablet Take 10 mg by mouth daily      DULoxetine (CYMBALTA) 60 MG extended release capsule Take 60 mg by mouth daily      predniSONE (DELTASONE) 1 MG tablet Take 1 mg by mouth daily 2 tabs       No current facility-administered medications for this visit. Allergies   Allergen Reactions    Morphine      Burning,tingling         Health Maintenance   Topic Date Due    Diabetic retinal exam  01/06/1966    HIV screen  01/06/1971    DTaP/Tdap/Td vaccine (1 - Tdap) 01/06/1975    Diabetic foot exam  03/28/2019    Shingles Vaccine (1 of 2) 03/13/2020 (Originally 1/6/2006)    Flu vaccine (Season Ended) 09/01/2019    A1C test (Diabetic or Prediabetic)  05/28/2020    Diabetic microalbuminuria test  05/28/2020    Lipid screen  05/28/2020    TSH testing  05/28/2020    Potassium monitoring  05/28/2020    Creatinine monitoring  05/28/2020    Colon cancer screen colonoscopy  09/21/2025    Pneumococcal 0-64 years Vaccine  Completed    Hepatitis C screen  Completed       Subjective:      Review of Systems   Constitutional: Negative for fatigue. Here for routine follow up without concern   Respiratory: Negative for shortness of breath. Cardiovascular: Negative for chest pain, palpitations and leg swelling. Genitourinary: Negative for frequency. Neurological: Negative for dizziness. Home BP Checks? yes, usually runs 120's/60's  Medication Compliant? yes    Objective:     /78   Pulse 80   Ht 5' 6\" (1.676 m)   Wt 228 lb (103.4 kg)   SpO2 97%   BMI 36.80 kg/m²   Physical Exam   Constitutional: He is oriented to person, place, and time. He appears well-developed and well-nourished. No distress. HENT:   Head: Atraumatic. Eyes: Conjunctivae are normal.   Neck: Neck supple. Carotid bruit is not present.  No thyromegaly present. Cardiovascular: Normal rate and regular rhythm. No murmur heard. Pulmonary/Chest: Effort normal and breath sounds normal.   Abdominal: Bowel sounds are normal.   Musculoskeletal: He exhibits no edema. Right lower leg: He exhibits no swelling. Left lower leg: He exhibits no swelling. Lymphadenopathy:     He has no cervical adenopathy. Neurological: He is alert and oriented to person, place, and time. Skin:   Skin tags on neck     Psychiatric: He has a normal mood and affect. Down 6 # more at recent yovanny reviewed with pt  Diabetic foot exam - normal strength; intact sensation to 10 gm monofilament; normal pulses, no ulcerations or callouses. Good capillary refill. Toenails unremarkable. Lab Results   Component Value Date     05/28/2019    K 4.4 05/28/2019     05/28/2019    CO2 27 05/28/2019     Lab Results   Component Value Date    CREATININE 1.0 05/28/2019     Lab Results   Component Value Date    CHOL 163 05/28/2019    CHOL 136 08/21/2018    CHOL 156 03/27/2018     Lab Results   Component Value Date    TRIG 910 (H) 05/28/2019    TRIG 290 (H) 08/21/2018    TRIG 276 (H) 03/27/2018     Lab Results   Component Value Date    HDL 36 09/16/2016    HDL 36 06/17/2016     Lab Results   Component Value Date    LDLCALC -52.0 05/28/2019    LDLCALC 34.0 08/21/2018    LDLCALC 61.8 03/27/2018     Lab Results   Component Value Date    VLDL 182 05/28/2019    VLDL 58 (H) 08/21/2018    VLDL 55 (H) 03/27/2018     Lab Results   Component Value Date    CHOLHDLRATIO 4.9 (H) 05/28/2019    CHOLHDLRATIO 3.1 08/21/2018    CHOLHDLRATIO 4.0 03/27/2018   HDL 33 down from 44  microalbumin ratio excellent 4  TSH 0.52  A1C 7.1      Assessment:      1. Peripheral vascular disease ([address]    2. Essential hypertension    3. HDL deficiency    4. Type 2 diabetes mellitus without complication, without long-term current use of insulin ([address]    5. Acquired hypothyroidism    6.

## 2019-08-06 ENCOUNTER — OFFICE VISIT (OUTPATIENT)
Dept: FAMILY MEDICINE CLINIC | Age: 64
End: 2019-08-06
Payer: COMMERCIAL

## 2019-08-06 VITALS
WEIGHT: 221 LBS | BODY MASS INDEX: 35.52 KG/M2 | HEART RATE: 80 BPM | HEIGHT: 66 IN | OXYGEN SATURATION: 97 % | DIASTOLIC BLOOD PRESSURE: 64 MMHG | SYSTOLIC BLOOD PRESSURE: 132 MMHG

## 2019-08-06 DIAGNOSIS — M25.561 ACUTE PAIN OF RIGHT KNEE: Primary | ICD-10-CM

## 2019-08-06 DIAGNOSIS — M70.41 PREPATELLAR BURSITIS OF RIGHT KNEE: ICD-10-CM

## 2019-08-06 PROCEDURE — 99213 OFFICE O/P EST LOW 20 MIN: CPT | Performed by: FAMILY MEDICINE

## 2019-08-06 RX ORDER — MELOXICAM 15 MG/1
15 TABLET ORAL DAILY
Qty: 30 TABLET | Refills: 2 | Status: SHIPPED | OUTPATIENT
Start: 2019-08-06 | End: 2019-10-14 | Stop reason: ALTCHOICE

## 2019-08-06 NOTE — PROGRESS NOTES
[address] [address]  Dept: [phone]  Dept Fax: [phone]    Soniya Alexander is a 62 y.o. male who presents today for his medical conditions/complaints as noted below. Soniya Alexander c/o of Knee Pain      HPI:     HPI   Patient is here complaining of right knee pain which started yesterday morning. He reports the day before he had been fine and no known injury was noted. He did take ibuprofen from a previous dental prescription which has helped significantly    No similar issues in past recalled. BP Readings from Last 3 Encounters:   08/06/19 132/64   06/04/19 138/78   08/22/18 130/70          (goal 120/80)    Past Medical History:   Diagnosis Date    Hyperlipidemia     Hypothyroidism     Polymyalgia rheumatica (HCC)     Shingles     Shingles       Past Surgical History:   Procedure Laterality Date    BACK SURGERY      x4    CARPAL TUNNEL RELEASE Bilateral     CATARACT REMOVAL WITH IMPLANT Bilateral 10/2017    COLONOSCOPY      EYE SURGERY  09/2017    Cataract    EYE SURGERY  10/2017    Cataract    HERNIA REPAIR      baby    LAMINECTOMY  08/16/2016    decompression    TONSILLECTOMY      TUMOR REMOVAL      lt arm       Family History   Problem Relation Age of Onset   [facility] Cancer Father         brain tumor    Diabetes Sister        Social History     Tobacco Use    Smoking status: Current Every Day Smoker     Packs/day: 0.50     Years: 40.00     Pack years: 20.00     Types: Cigarettes    Smokeless tobacco: Never Used   Substance Use Topics    Alcohol use: Not on file      Prior to Admission medications    Medication Sig Start Date End Date Taking?  Authorizing Provider   meloxicam (MOBIC) 15 MG tablet Take 1 tablet by mouth daily 8/6/19  Yes Jah Sun MD   lisinopril-hydrochlorothiazide (PRINZIDE;ZESTORETIC) 10-12.5 MG per tablet TAKE 1 TABLET BY MOUTH ONCE DAILY 6/4/19  Yes Jah Sun MD   rosuvastatin (CRESTOR) 40

## 2019-08-27 ENCOUNTER — OFFICE VISIT (OUTPATIENT)
Dept: FAMILY MEDICINE CLINIC | Age: 64
End: 2019-08-27
Payer: COMMERCIAL

## 2019-08-27 VITALS
OXYGEN SATURATION: 97 % | HEART RATE: 74 BPM | DIASTOLIC BLOOD PRESSURE: 70 MMHG | WEIGHT: 225 LBS | SYSTOLIC BLOOD PRESSURE: 128 MMHG | BODY MASS INDEX: 36.16 KG/M2 | HEIGHT: 66 IN

## 2019-08-27 DIAGNOSIS — M25.512 ACUTE PAIN OF LEFT SHOULDER: ICD-10-CM

## 2019-08-27 DIAGNOSIS — M54.2 NECK PAIN: Primary | ICD-10-CM

## 2019-08-27 PROCEDURE — 99213 OFFICE O/P EST LOW 20 MIN: CPT | Performed by: FAMILY MEDICINE

## 2019-08-27 RX ORDER — HYDROCODONE BITARTRATE AND ACETAMINOPHEN 5; 325 MG/1; MG/1
TABLET ORAL
Refills: 0 | COMMUNITY
Start: 2019-08-23 | End: 2019-10-14 | Stop reason: ALTCHOICE

## 2019-08-27 RX ORDER — CYCLOBENZAPRINE HCL 10 MG
TABLET ORAL
Refills: 0 | COMMUNITY
Start: 2019-08-23 | End: 2019-10-14 | Stop reason: ALTCHOICE

## 2019-08-27 ASSESSMENT — ENCOUNTER SYMPTOMS: SHORTNESS OF BREATH: 0

## 2019-08-27 NOTE — PROGRESS NOTES
[address] [place] [address]  Dept: [phone]  Dept Fax: [phone]    Reta Lombard is a 62 y.o. male who presents today for his medical conditions/complaints as noted below. Reta Lombard c/o of Other [facility] ER Follow up)      HPI:     HPI  Patient is here in follow-up from [facility] ER on 8/23/2019 with left-sided neck pain rating into his shoulder. At that time he was given IM Toradol and sent home with IV prophylaxis for Flexeril and Norco he has been taking ibuprofen 3 times daily as directed Flexeril at bedtime not using Norco for the past 2 days. Pain is currently continuing at a 4 out of 10 although is worse in the morning and 8 out of 10. No known injury was noted    BP Readings from Last 3 Encounters:   08/27/19 128/70   08/06/19 132/64   06/04/19 138/78          (goal 120/80)    Past Medical History:   Diagnosis Date    Hyperlipidemia     Hypothyroidism     Polymyalgia rheumatica (HCC)     Shingles     Shingles       Past Surgical History:   Procedure Laterality Date    BACK SURGERY      x4    CARPAL TUNNEL RELEASE Bilateral     CATARACT REMOVAL WITH IMPLANT Bilateral 10/2017    COLONOSCOPY      EYE SURGERY  09/2017    Cataract    EYE SURGERY  10/2017    Cataract    HERNIA REPAIR      baby    LAMINECTOMY  08/16/2016    decompression    TONSILLECTOMY      TUMOR REMOVAL      lt arm       Family History   Problem Relation Age of Onset   [facility] Cancer Father         brain tumor    Diabetes Sister        Social History     Tobacco Use    Smoking status: Current Every Day Smoker     Packs/day: 0.50     Years: 40.00     Pack years: 20.00     Types: Cigarettes    Smokeless tobacco: Never Used   Substance Use Topics    Alcohol use: Not on file      Prior to Admission medications    Medication Sig Start Date End Date Taking?  Authorizing Provider   cyclobenzaprine (FLEXERIL) 10 MG tablet TAKE 1 TABLET BY MOUTH THREE TIMES DAILY AS NEEDED 8/23/19  Yes Historical Provider, MD   HYDROcodone-acetaminophen (NORCO) 5-325 MG per tablet TAKE 1 TABLET BY MOUTH EVERY 4 TO 6 HOURS AS NEEDED 8/23/19  Yes Historical Provider, MD   oxaprozin (DAYPRO) 600 MG tablet Take 1 tablet by mouth 2 times daily 8/27/19  Yes Bernadine Christian MD   meloxicam (MOBIC) 15 MG tablet Take 1 tablet by mouth daily 8/6/19  Yes Bernadine Christian MD   lisinopril-hydrochlorothiazide (PRINZIDE;ZESTORETIC) 10-12.5 MG per tablet TAKE 1 TABLET BY MOUTH ONCE DAILY 6/4/19  Yes Bernadine Christian MD   rosuvastatin (CRESTOR) 40 MG tablet Take 1 tablet by mouth daily 6/4/19  Yes Bernadine Christian MD   levothyroxine (SYNTHROID) 200 MCG tablet TAKE 1 TABLET BY MOUTH ONCE DAILY 3/11/19  Yes Bernadine Christian MD   aspirin 81 MG chewable tablet One dialy 9/6/18  Yes Bernadine Christian MD   polyethylene glycol (GLYCOLAX) powder Take 17 g by mouth daily   Yes Historical Provider, MD   cetirizine (ZYRTEC) 10 MG tablet Take 10 mg by mouth daily   Yes Historical Provider, MD   DULoxetine (CYMBALTA) 60 MG extended release capsule Take 60 mg by mouth daily   Yes Historical Provider, MD   predniSONE (DELTASONE) 1 MG tablet Take 1 mg by mouth daily 2 tabs   Yes Historical Provider, MD     Allergies   Allergen Reactions    Morphine      [facility] Maintenance   Topic Date Due    Diabetic retinal exam  01/06/1966    HIV screen  01/06/1971    Shingles Vaccine (1 of 2) 03/13/2020 (Originally 1/6/2006)    Flu vaccine (1) 09/01/2019    A1C test (Diabetic or Prediabetic)  05/28/2020    Diabetic microalbuminuria test  05/28/2020    Lipid screen  05/28/2020    TSH testing  05/28/2020    Potassium monitoring  05/28/2020    Creatinine monitoring  05/28/2020    Diabetic foot exam  06/04/2020    Colon cancer screen colonoscopy  09/21/2025    DTaP/Tdap/Td vaccine (2 - Td) 06/04/2029    Pneumococcal 0-64 years Vaccine  Completed    Hepatitis C screen  Completed       Subjective: Review of Systems   Constitutional: Negative for fatigue. Patient is here to follow up from [facility] ER visit on 8/23/2019 for left side neck pain radiating into left shoulder. He was given IM Torodol and sent home with Rx for ibuprofen, flexeril, and norco. He is taking the Ibuprofen 800 mg TID as directed. Flexeril at bedtime,aand has not used the Norco in the last 2 days. At this time pain continues and is rated 4/10 right now, much worse in AM, rated 8/10. Denies SOB, Chest pain, dyspnea, or HA. Denies any recent injury to the area. Respiratory: Negative for shortness of breath. Cardiovascular: Negative for chest pain, palpitations and leg swelling. Genitourinary: Negative for frequency. Neurological: Negative for dizziness. Low back prior disk issues per pt    Objective:     /70   Pulse 74   Ht 5' 6\" (1.676 m)   Wt 225 lb (102.1 kg)   SpO2 97%   BMI 36.32 kg/m²     Physical Exam   Constitutional: He is oriented to person, place, and time. He appears well-developed and well-nourished. No distress. Cardiovascular: Normal rate and regular rhythm. No murmur heard. Pulmonary/Chest: Effort normal and breath sounds normal. No respiratory distress. Musculoskeletal: He exhibits no edema, tenderness or deformity. Cervical back: Normal.        Thoracic back: Normal.        Lumbar back: Normal.   Neurological: He is alert and oriented to person, place, and time. Assessment:     1. Neck pain    2. Acute pain of left shoulder      No results found for this visit on 08/27/19.         Plan:     Orders Placed This Encounter   Procedures   [facility] Physical Therapy [facility]     Referral Priority:   Routine     Referral Type:   Eval and Treat     Referral Reason:   Specialty Services Required     Referral Location:   [address] [place] [address] Specialty:   Physical Therapy     Number of Visits Requested:   1       Orders Placed This Encounter   Medications   

## 2019-09-01 DIAGNOSIS — E03.9 ACQUIRED HYPOTHYROIDISM: ICD-10-CM

## 2019-09-03 RX ORDER — LEVOTHYROXINE SODIUM 0.2 MG/1
TABLET ORAL
Qty: 90 TABLET | Refills: 1 | Status: SHIPPED | OUTPATIENT
Start: 2019-09-03 | End: 2020-03-06

## 2019-09-16 ENCOUNTER — OFFICE VISIT (OUTPATIENT)
Dept: FAMILY MEDICINE CLINIC | Age: 64
End: 2019-09-16
Payer: COMMERCIAL

## 2019-09-16 VITALS
HEART RATE: 42 BPM | BODY MASS INDEX: 35.2 KG/M2 | WEIGHT: 219 LBS | HEIGHT: 66 IN | DIASTOLIC BLOOD PRESSURE: 72 MMHG | SYSTOLIC BLOOD PRESSURE: 120 MMHG | OXYGEN SATURATION: 96 %

## 2019-09-16 DIAGNOSIS — S61.208A OPEN WOUND OF MIDDLE FINGER: Primary | ICD-10-CM

## 2019-09-16 PROCEDURE — 99212 OFFICE O/P EST SF 10 MIN: CPT | Performed by: FAMILY MEDICINE

## 2019-10-12 LAB
AGE FOR GFR: 63
ANION GAP SERPL CALCULATED.3IONS-SCNC: 10 MMOL/L
CHLORIDE BLD-SCNC: 103 MMOL/L (ref 98–120)
CHOLESTEROL/HDL RATIO: 3 RATIO (ref 0–4.5)
CHOLESTEROL: 115 MG/DL (ref 50–200)
CO2: 31 MMOL/L (ref 22–31)
CREAT SERPL-MCNC: 1.3 MG/DL (ref 0.7–1.3)
EGFR BF: 50 ML/MIN/1.73 M2
EGFR BM: 68 ML/MIN/1.73 M2
EGFR WF: 41 ML/MIN/1.73 M2
EGFR WM: 56 ML/MIN/1.73 M2
HDL, DIRECT: 38 MG/DL (ref 36–68)
LDL CHOLESTEROL CALCULATED: 36.8 MG/DL (ref 0–160)
POTASSIUM SERPL-SCNC: 4.3 MMOL/L (ref 3.6–5)
SODIUM BLD-SCNC: 140 MMOL/L (ref 135–145)
TRIGL SERPL-MCNC: 201 MG/DL (ref 10–250)
TSH SERPL DL<=0.05 MIU/L-ACNC: 2.15 MIU/ML (ref 0.49–4.6)
VLDLC SERPL CALC-MCNC: 40 MG/DL (ref 0–40)

## 2019-10-14 ENCOUNTER — OFFICE VISIT (OUTPATIENT)
Dept: FAMILY MEDICINE CLINIC | Age: 64
End: 2019-10-14
Payer: COMMERCIAL

## 2019-10-14 VITALS
OXYGEN SATURATION: 97 % | WEIGHT: 220 LBS | HEIGHT: 66 IN | DIASTOLIC BLOOD PRESSURE: 68 MMHG | HEART RATE: 65 BPM | BODY MASS INDEX: 35.36 KG/M2 | SYSTOLIC BLOOD PRESSURE: 90 MMHG

## 2019-10-14 DIAGNOSIS — Z23 IMMUNIZATION DUE: ICD-10-CM

## 2019-10-14 DIAGNOSIS — E78.6 HDL DEFICIENCY: ICD-10-CM

## 2019-10-14 DIAGNOSIS — E03.9 ACQUIRED HYPOTHYROIDISM: ICD-10-CM

## 2019-10-14 DIAGNOSIS — E11.9 TYPE 2 DIABETES MELLITUS WITHOUT COMPLICATION, WITHOUT LONG-TERM CURRENT USE OF INSULIN (HCC): Primary | ICD-10-CM

## 2019-10-14 DIAGNOSIS — I10 ESSENTIAL HYPERTENSION: ICD-10-CM

## 2019-10-14 DIAGNOSIS — E78.1 HYPERTRIGLYCERIDEMIA: ICD-10-CM

## 2019-10-14 LAB — HBA1C MFR BLD: 6.5 %

## 2019-10-14 PROCEDURE — 99214 OFFICE O/P EST MOD 30 MIN: CPT | Performed by: FAMILY MEDICINE

## 2019-10-14 PROCEDURE — 83036 HEMOGLOBIN GLYCOSYLATED A1C: CPT | Performed by: FAMILY MEDICINE

## 2019-10-14 PROCEDURE — 90471 IMMUNIZATION ADMIN: CPT | Performed by: FAMILY MEDICINE

## 2019-10-14 PROCEDURE — 90686 IIV4 VACC NO PRSV 0.5 ML IM: CPT | Performed by: FAMILY MEDICINE

## 2019-10-14 RX ORDER — LISINOPRIL 10 MG/1
10 TABLET ORAL DAILY
Qty: 90 TABLET | Refills: 1 | Status: SHIPPED | OUTPATIENT
Start: 2019-10-14 | End: 2020-03-14 | Stop reason: SDUPTHER

## 2019-10-14 ASSESSMENT — ENCOUNTER SYMPTOMS: SHORTNESS OF BREATH: 0

## 2019-12-06 DIAGNOSIS — I10 ESSENTIAL HYPERTENSION: ICD-10-CM

## 2019-12-08 RX ORDER — LISINOPRIL AND HYDROCHLOROTHIAZIDE 12.5; 1 MG/1; MG/1
TABLET ORAL
Qty: 90 TABLET | Refills: 1 | Status: SHIPPED | OUTPATIENT
Start: 2019-12-08 | End: 2020-09-23 | Stop reason: ALTCHOICE

## 2020-03-06 RX ORDER — LEVOTHYROXINE SODIUM 0.2 MG/1
TABLET ORAL
Qty: 90 TABLET | Refills: 0 | Status: SHIPPED | OUTPATIENT
Start: 2020-03-06 | End: 2020-06-08

## 2020-03-06 NOTE — TELEPHONE ENCOUNTER
[facility] is requesting a refill on the following medication(s):  Requested Prescriptions     Pending Prescriptions Disp Refills    levothyroxine (SYNTHROID) 200 MCG tablet [Pharmacy Med Name: Levothyroxine Sodium 200 MCG Oral Tablet] 90 tablet 0     Sig: Take 1 tablet by mouth once daily       Last Visit Date (If Applicable):  71/55/5583    Next Visit Date:    4/14/2020

## 2020-03-16 RX ORDER — LISINOPRIL 10 MG/1
10 TABLET ORAL DAILY
Qty: 90 TABLET | Refills: 1 | Status: SHIPPED | OUTPATIENT
Start: 2020-03-16 | End: 2020-09-14 | Stop reason: SDUPTHER

## 2020-03-16 NOTE — TELEPHONE ENCOUNTER
Shiva Santos is requesting a refill on the following medication(s):  Requested Prescriptions     Pending Prescriptions Disp Refills    oxaprozin (DAYPRO) 600 MG tablet 60 tablet 3     Sig: Take 1 tablet by mouth 2 times daily    lisinopril (PRINIVIL;ZESTRIL) 10 MG tablet 90 tablet 1     Sig: Take 1 tablet by mouth daily       Last Visit Date (If Applicable):  67/05/0915    Next Visit Date:    4/14/2020

## 2020-06-08 RX ORDER — LEVOTHYROXINE SODIUM 0.2 MG/1
TABLET ORAL
Qty: 90 TABLET | Refills: 0 | Status: SHIPPED | OUTPATIENT
Start: 2020-06-08 | End: 2020-09-23 | Stop reason: SDUPTHER

## 2020-06-08 NOTE — TELEPHONE ENCOUNTER
[facility] is requesting a refill on the following medication(s):  Requested Prescriptions     Pending Prescriptions Disp Refills    levothyroxine (SYNTHROID) 200 MCG tablet [Pharmacy Med Name: Levothyroxine Sodium 200 MCG Oral Tablet] 90 tablet 0     Sig: Take 1 tablet by mouth once daily       Last Visit Date (If Applicable):  02/48/0496    Next Visit Date:    Visit date not found

## 2020-09-04 LAB
ANION GAP SERPL CALCULATED.3IONS-SCNC: 6.7 MMOL/L
CHLORIDE BLD-SCNC: 105 MMOL/L (ref 98–120)
CO2: 27 MMOL/L (ref 22–31)
CREAT SERPL-MCNC: 1.4 MG/DL (ref 0.7–1.3)
GFR CALCULATED: 54.2
POTASSIUM SERPL-SCNC: 4.5 MMOL/L (ref 3.6–5)
SODIUM BLD-SCNC: 139 MMOL/L (ref 135–145)
TSH SERPL DL<=0.05 MIU/L-ACNC: <0.02 MIU/ML (ref 0.49–4.67)

## 2020-09-11 NOTE — TELEPHONE ENCOUNTER
Jennyfer Jackson is requesting a refill on the following medication(s):  Requested Prescriptions     Pending Prescriptions Disp Refills    levothyroxine (SYNTHROID) 200 MCG tablet 90 tablet 0       Last Visit Date (If Applicable):  0/45/1790    Next Visit Date:    Visit date not found

## 2020-09-14 RX ORDER — LISINOPRIL 10 MG/1
10 TABLET ORAL DAILY
Qty: 90 TABLET | Refills: 1 | Status: SHIPPED | OUTPATIENT
Start: 2020-09-14 | End: 2021-03-14

## 2020-09-14 RX ORDER — LEVOTHYROXINE SODIUM 0.2 MG/1
TABLET ORAL
Qty: 90 TABLET | Refills: 0 | OUTPATIENT
Start: 2020-09-14

## 2020-09-14 NOTE — TELEPHONE ENCOUNTER
Nasim Molina is requesting a refill on the following medication(s):  Requested Prescriptions     Pending Prescriptions Disp Refills    lisinopril (PRINIVIL;ZESTRIL) 10 MG tablet 90 tablet 1     Sig: Take 1 tablet by mouth daily       Last Visit Date (If Applicable):  21/67/8012    Next Visit Date:    Visit date not found

## 2020-09-23 ENCOUNTER — OFFICE VISIT (OUTPATIENT)
Dept: FAMILY MEDICINE CLINIC | Age: 65
End: 2020-09-23
Payer: COMMERCIAL

## 2020-09-23 VITALS
OXYGEN SATURATION: 97 % | DIASTOLIC BLOOD PRESSURE: 82 MMHG | WEIGHT: 219 LBS | HEART RATE: 68 BPM | BODY MASS INDEX: 35.2 KG/M2 | HEIGHT: 66 IN | SYSTOLIC BLOOD PRESSURE: 128 MMHG | TEMPERATURE: 97.2 F

## 2020-09-23 LAB — HBA1C MFR BLD: 7.1 %

## 2020-09-23 PROCEDURE — 83036 HEMOGLOBIN GLYCOSYLATED A1C: CPT | Performed by: FAMILY MEDICINE

## 2020-09-23 PROCEDURE — 90674 CCIIV4 VAC NO PRSV 0.5 ML IM: CPT | Performed by: FAMILY MEDICINE

## 2020-09-23 PROCEDURE — 90471 IMMUNIZATION ADMIN: CPT | Performed by: FAMILY MEDICINE

## 2020-09-23 PROCEDURE — 99214 OFFICE O/P EST MOD 30 MIN: CPT | Performed by: FAMILY MEDICINE

## 2020-09-23 PROCEDURE — 3051F HG A1C>EQUAL 7.0%<8.0%: CPT | Performed by: FAMILY MEDICINE

## 2020-09-23 RX ORDER — LEVOTHYROXINE SODIUM 175 UG/1
175 TABLET ORAL DAILY
Qty: 90 TABLET | Refills: 1 | Status: SHIPPED | OUTPATIENT
Start: 2020-09-23 | End: 2021-03-14

## 2020-09-23 RX ORDER — VARENICLINE TARTRATE
KIT
Qty: 1 BOX | Refills: 1 | Status: SHIPPED | OUTPATIENT
Start: 2020-09-23 | End: 2021-09-27

## 2020-09-23 ASSESSMENT — PATIENT HEALTH QUESTIONNAIRE - PHQ9
SUM OF ALL RESPONSES TO PHQ QUESTIONS 1-9: 0
SUM OF ALL RESPONSES TO PHQ QUESTIONS 1-9: 0
SUM OF ALL RESPONSES TO PHQ9 QUESTIONS 1 & 2: 0
2. FEELING DOWN, DEPRESSED OR HOPELESS: 0
1. LITTLE INTEREST OR PLEASURE IN DOING THINGS: 0

## 2020-09-23 ASSESSMENT — ENCOUNTER SYMPTOMS: SHORTNESS OF BREATH: 0

## 2020-09-23 NOTE — PROGRESS NOTES
1 tablet by mouth Daily 90 tablet 1    oxaprozin (DAYPRO) 600 MG tablet Take 1 tablet by mouth 2 times daily 60 tablet 5    lisinopril (PRINIVIL;ZESTRIL) 10 MG tablet Take 1 tablet by mouth daily 90 tablet 1    rosuvastatin (CRESTOR) 40 MG tablet Take 1 tablet by mouth once daily 90 tablet 1    aspirin 81 MG chewable tablet One dialy 100 tablet 3    polyethylene glycol (GLYCOLAX) powder Take 17 g by mouth daily       No current facility-administered medications for this visit. Allergies   Allergen Reactions    Morphine      Burning,tingling         Health Maintenance   Topic Date Due    Diabetic retinal exam  01/06/1966    HIV screen  01/06/1971    Shingles Vaccine (1 of 2) 01/06/2006    Diabetic microalbuminuria test  05/28/2020    Diabetic foot exam  06/04/2020    Lipid screen  10/12/2020    A1C test (Diabetic or Prediabetic)  10/14/2020    TSH testing  09/04/2021    Potassium monitoring  09/04/2021    Creatinine monitoring  09/04/2021    Colon cancer screen colonoscopy  09/21/2025    DTaP/Tdap/Td vaccine (2 - Td) 06/04/2029    Flu vaccine  Completed    Pneumococcal 0-64 years Vaccine  Completed    Hepatitis C screen  Completed    Hepatitis A vaccine  Aged Out    Hib vaccine  Aged Out    Meningococcal (ACWY) vaccine  Aged Out       Subjective:      Review of Systems   Constitutional: Negative for fatigue. Eyes: Negative for visual disturbance. Respiratory: Negative for shortness of breath. Cardiovascular: Positive for leg swelling. Negative for chest pain and palpitations. Genitourinary: Negative for frequency. Neurological: Negative for dizziness. Blood Sugar Checks? no  Medication Compliant?  yes  Blood Pressure Checks? no    Objective:     /82 (Site: Left Upper Arm, Position: Sitting, Cuff Size: Small Adult)   Pulse 68   Temp 97.2 °F (36.2 °C) (Temporal)   Ht 5' 6\" (1.676 m)   Wt 219 lb (99.3 kg)   SpO2 97%   BMI 35.35 kg/m²   Physical Exam  Vitals signs reviewed. Constitutional:       General: He is not in acute distress. Appearance: He is well-developed. He is not ill-appearing. Comments: Improving obesity   HENT:      Head: Atraumatic. Eyes:      Conjunctiva/sclera: Conjunctivae normal.   Neck:      Musculoskeletal: Neck supple. Thyroid: No thyromegaly. Vascular: No carotid bruit. Cardiovascular:      Rate and Rhythm: Normal rate and regular rhythm. Heart sounds: No murmur. Pulmonary:      Effort: Pulmonary effort is normal.      Breath sounds: Normal breath sounds. Abdominal:      General: Bowel sounds are normal.      Palpations: Abdomen is soft. Musculoskeletal:         General: No swelling (BLE). Right lower leg: He exhibits no swelling. Left lower leg: He exhibits no swelling. Lymphadenopathy:      Cervical: No cervical adenopathy. Neurological:      Mental Status: He is alert and oriented to person, place, and time. Lab Results   Component Value Date    LABA1C 7.1 09/23/2020     No results found for: EAG  A1C 7.1 slight up from 6.5 prior  Weight remains at yovanny of last 3 years. Lab Results   Component Value Date     09/04/2020    K 4.5 09/04/2020     09/04/2020    CO2 27 09/04/2020     Lab Results   Component Value Date    CREATININE 1.4 (H) 09/04/2020     Lab Results   Component Value Date    TSH <0.02 (L) 09/04/2020       Assessment:      1. Type 2 diabetes mellitus without complication, without long-term current use of insulin ([facility] [address]    2. Immunization due    3. Essential hypertension    4. Acquired hypothyroidism    5. HDL deficiency    6. Tobacco abuse    7. Acute pain of left shoulder    8.  BMI 35.0-35.9,adult                     Plan:     Patient Instructions   optho follow up from diabetes  Encourage further weight loss, increase exercise to 30 min as able  Shingrix at pharmacy if desired    Orders Placed This Encounter   Procedures    INFLUENZA, MDCK QUADV, 4 YRS AND OLDER, IM, PF, PREFILL SYR OR SDV, 0.5ML (FLUCELVAX QUADV, PF)    TSH without Reflex     Standing Status:   Future     Standing Expiration Date:   9/23/2021    POCT glycosylated hemoglobin (Hb A1C)     Orders Placed This Encounter   Medications    varenicline (CHANTIX STARTING MONTH JJ) 0.5 MG X 11 & 1 MG X 42 tablet     Sig: Take by mouth. Dispense:  1 box     Refill:  1     Refill full dose for next refill    levothyroxine (SYNTHROID) 175 MCG tablet     Sig: Take 1 tablet by mouth Daily     Dispense:  90 tablet     Refill:  1    oxaprozin (DAYPRO) 600 MG tablet     Sig: Take 1 tablet by mouth 2 times daily     Dispense:  60 tablet     Refill:  5        Return in about 6 months (around 3/23/2021) for hypothyroid, HTN, DM. Discussed use, benefit, and side effects of prescribed medications. All patient questions answered. Pt voiced understanding. Reviewed health maintenance shingrix, . Instructed to continue current medications, diet and exercise. Patient agreed with treatment/plan. Follow up as directed.      Electronicallysigned by Maximiliano Casey MD on 9/23/2020

## 2020-09-23 NOTE — PATIENT INSTRUCTIONS
optho follow up from diabetes  Encourage further weight loss, increase exercise to 30 min as able  Shingrix at pharmacy if desired

## 2020-12-11 LAB — TSH SERPL DL<=0.05 MIU/L-ACNC: 1.73 MIU/ML (ref 0.49–4.67)

## 2021-03-12 DIAGNOSIS — E03.9 ACQUIRED HYPOTHYROIDISM: ICD-10-CM

## 2021-03-12 DIAGNOSIS — I10 ESSENTIAL HYPERTENSION: ICD-10-CM

## 2021-03-14 RX ORDER — LISINOPRIL 10 MG/1
TABLET ORAL
Qty: 90 TABLET | Refills: 0 | Status: SHIPPED | OUTPATIENT
Start: 2021-03-14 | End: 2021-06-14

## 2021-03-14 RX ORDER — LEVOTHYROXINE SODIUM 175 UG/1
TABLET ORAL
Qty: 90 TABLET | Refills: 0 | Status: SHIPPED | OUTPATIENT
Start: 2021-03-14 | End: 2021-03-24 | Stop reason: SDUPTHER

## 2021-03-24 ENCOUNTER — OFFICE VISIT (OUTPATIENT)
Dept: FAMILY MEDICINE CLINIC | Age: 66
End: 2021-03-24
Payer: COMMERCIAL

## 2021-03-24 VITALS
BODY MASS INDEX: 38.04 KG/M2 | HEIGHT: 66 IN | OXYGEN SATURATION: 97 % | DIASTOLIC BLOOD PRESSURE: 82 MMHG | WEIGHT: 236.7 LBS | HEART RATE: 57 BPM | SYSTOLIC BLOOD PRESSURE: 140 MMHG

## 2021-03-24 DIAGNOSIS — E03.9 ACQUIRED HYPOTHYROIDISM: ICD-10-CM

## 2021-03-24 DIAGNOSIS — E11.9 TYPE 2 DIABETES MELLITUS WITHOUT COMPLICATION, WITHOUT LONG-TERM CURRENT USE OF INSULIN (HCC): ICD-10-CM

## 2021-03-24 DIAGNOSIS — Z00.00 WELL ADULT EXAM: Primary | ICD-10-CM

## 2021-03-24 DIAGNOSIS — L91.8 CUTANEOUS SKIN TAGS: ICD-10-CM

## 2021-03-24 DIAGNOSIS — E78.6 HDL DEFICIENCY: ICD-10-CM

## 2021-03-24 DIAGNOSIS — Z87.891 FORMER TOBACCO USE: ICD-10-CM

## 2021-03-24 DIAGNOSIS — I10 ESSENTIAL HYPERTENSION: ICD-10-CM

## 2021-03-24 LAB — HBA1C MFR BLD: 7.8 %

## 2021-03-24 PROCEDURE — 83036 HEMOGLOBIN GLYCOSYLATED A1C: CPT | Performed by: FAMILY MEDICINE

## 2021-03-24 PROCEDURE — 99397 PER PM REEVAL EST PAT 65+ YR: CPT | Performed by: FAMILY MEDICINE

## 2021-03-24 RX ORDER — LEVOTHYROXINE SODIUM 175 UG/1
TABLET ORAL
Qty: 90 TABLET | Refills: 1 | Status: SHIPPED | OUTPATIENT
Start: 2021-03-24 | End: 2021-09-27 | Stop reason: SDUPTHER

## 2021-03-24 SDOH — ECONOMIC STABILITY: INCOME INSECURITY: HOW HARD IS IT FOR YOU TO PAY FOR THE VERY BASICS LIKE FOOD, HOUSING, MEDICAL CARE, AND HEATING?: NOT HARD AT ALL

## 2021-03-24 SDOH — ECONOMIC STABILITY: FOOD INSECURITY: WITHIN THE PAST 12 MONTHS, YOU WORRIED THAT YOUR FOOD WOULD RUN OUT BEFORE YOU GOT MONEY TO BUY MORE.: NEVER TRUE

## 2021-03-24 SDOH — ECONOMIC STABILITY: TRANSPORTATION INSECURITY
IN THE PAST 12 MONTHS, HAS THE LACK OF TRANSPORTATION KEPT YOU FROM MEDICAL APPOINTMENTS OR FROM GETTING MEDICATIONS?: NO

## 2021-03-24 ASSESSMENT — PATIENT HEALTH QUESTIONNAIRE - PHQ9
SUM OF ALL RESPONSES TO PHQ QUESTIONS 1-9: 0
SUM OF ALL RESPONSES TO PHQ QUESTIONS 1-9: 0
2. FEELING DOWN, DEPRESSED OR HOPELESS: 0

## 2021-03-24 ASSESSMENT — ENCOUNTER SYMPTOMS: SHORTNESS OF BREATH: 0

## 2021-03-24 NOTE — PATIENT INSTRUCTIONS
Encourage annual diabetic eye exam with information sent to office.   Due for pneumovax 13 reviewed with pt 1 month after covid completed

## 2021-03-24 NOTE — PROGRESS NOTES
[address] [address]  Dept: [phone]  Dept Fax: [phone]    Madonna Osorio is a 73 y.o. male who presents today for his medical conditions/complaints as noted below. Madonna Osorio is c/o of Hypertension, Diabetes, and Annual Exam      HPI:     HPI  Pt here for annual wellness evaluation and review of DM, HTN, lipid. Having leg swelling daily. Persistant foot issues following prior neurosurgery. Still not smoking with weight gain noted    Has now retired. Brown spot noted over sternum seems more diffuse noted since summer. No increase in size.     BP Readings from Last 3 Encounters:   21 (!) 140/82   20 128/82   10/14/19 90/68          (goal 120/80)    Past Medical History:   Diagnosis Date    Hyperlipidemia     Hypothyroidism     Polymyalgia rheumatica (HCC)     Shingles       Past Surgical History:   Procedure Laterality Date    BACK SURGERY      x4    CARPAL TUNNEL RELEASE Bilateral     CATARACT REMOVAL WITH IMPLANT Bilateral 10/2017    COLONOSCOPY      EYE SURGERY  2017    Cataract    EYE SURGERY  10/2017    Cataract    HERNIA REPAIR      baby    LAMINECTOMY  2016    decompression    TONSILLECTOMY      TUMOR REMOVAL      lt arm       Family History   Problem Relation Age of Onset    Cancer Father         brain tumor    Diabetes Sister     Stroke Maternal Grandmother         68       Social History     Tobacco Use    Smoking status: Former Smoker     Packs/day: 0.50     Years: 40.00     Pack years: 20.00     Types: Cigarettes     Quit date: 10/5/2020     Years since quittin.4    Smokeless tobacco: Never Used   Substance Use Topics    Alcohol use: Not on file      Current Outpatient Medications   Medication Sig Dispense Refill    levothyroxine (SYNTHROID) 175 MCG tablet Take 1 tablet by mouth once daily 90 tablet 1    metFORMIN (GLUCOPHAGE) 500 MG tablet Take 1 tablet by mouth 2 times daily (with meals) 60 tablet 5    lisinopril (PRINIVIL;ZESTRIL) 10 MG tablet Take 1 tablet by mouth once daily 90 tablet 0    rosuvastatin (CRESTOR) 40 MG tablet Take 1 tablet by mouth once daily 90 tablet 3    varenicline (CHANTIX STARTING MONTH JJ) 0.5 MG X 11 & 1 MG X 42 tablet Take by mouth. 1 box 1    oxaprozin (DAYPRO) 600 MG tablet Take 1 tablet by mouth 2 times daily 60 tablet 5    aspirin 81 MG chewable tablet One dialy 100 tablet 3    polyethylene glycol (GLYCOLAX) powder Take 17 g by mouth daily       No current facility-administered medications for this visit. Allergies   Allergen Reactions    Morphine      Burning,tingling         Health Maintenance   Topic Date Due    Diabetic retinal exam  Never done    HIV screen  Never done    Diabetic microalbuminuria test  05/28/2020    Lipid screen  10/12/2020    Pneumococcal 65+ years Vaccine (1 of 1 - PPSV23) 01/06/2021    Potassium monitoring  09/04/2021    Creatinine monitoring  09/04/2021    TSH testing  12/11/2021    Diabetic foot exam  03/24/2022    A1C test (Diabetic or Prediabetic)  03/24/2022    Colon cancer screen colonoscopy  09/21/2025    DTaP/Tdap/Td vaccine (2 - Td) 06/04/2029    Flu vaccine  Completed    Shingles Vaccine  Completed    COVID-19 Vaccine  Completed    AAA screen  Completed    Hepatitis C screen  Completed    Hepatitis A vaccine  Aged Out    Hib vaccine  Aged Out    Meningococcal (ACWY) vaccine  Aged Out       Subjective:      Review of Systems   Constitutional: Negative for fatigue. Eyes: Negative for visual disturbance. Respiratory: Negative for shortness of breath. Cardiovascular: Positive for leg swelling (swelling everyday). Negative for chest pain and palpitations. Genitourinary: Negative for frequency. Neurological: Negative for dizziness and headaches. Blood Sugar Checks? no  Medication Compliant? yes  Blood Pressure Checks?  yes    Objective:     BP (!) 140/82 (Site: Right Upper Arm, Position: Sitting, Cuff Size: Large Adult)   Pulse 57   Ht 5' 6\" (1.676 m)   Wt 236 lb 11.2 oz (107.4 kg)   SpO2 97%   BMI 38.20 kg/m²   Physical Exam  Vitals signs reviewed. HENT:      Head: Normocephalic. Eyes:      Conjunctiva/sclera: Conjunctivae normal.   Neck:      Musculoskeletal: Neck supple. Vascular: No carotid bruit. Cardiovascular:      Rate and Rhythm: Normal rate and regular rhythm. Heart sounds: No murmur. Pulmonary:      Effort: Pulmonary effort is normal. No respiratory distress. Musculoskeletal:         General: Swelling: trace. Lymphadenopathy:      Cervical: No cervical adenopathy. Skin:     Findings: Lesion (skin tag on neck noted) present. Neurological:      Mental Status: He is alert. Lab Results   Component Value Date    LABA1C 7.8 03/24/2021     No results found for: EAG  A1C 7.8 up from 7.1    Weight up 17 # noted at max since 2017    Diabetic foot exam - normal strength; intact sensation to 10 gm monofilament; normal pulses, no ulcerations or callouses. Good capillary refill. Toenails unremarkable. Skin tags severe axilla bilaterally    Assessment:      1. Well adult exam    2. Type 2 diabetes mellitus without complication, without long-term current use of insulin ([address]    3. Essential hypertension    4. Acquired hypothyroidism    5. HDL deficiency    6. Tobacco abuse    7. BMI 38.0-38.9,adult    8. Cutaneous skin tags               Plan:     Patient Instructions   Encourage annual diabetic eye exam with information sent to office.   Due for pneumovax 13 reviewed with pt 1 month after covid completed    Orders Placed This Encounter   Procedures    Lipid Panel     Standing Status:   Future     Standing Expiration Date:   3/24/2022     Order Specific Question:   Is Patient Fasting?/# of Hours     Answer:   10-12    Electrolyte Panel     Standing Status:   Future     Standing Expiration Date:   3/24/2022    Creatinine, Serum Standing Status:   Future     Standing Expiration Date:   3/24/2022    TSH without Reflex     Standing Status:   Future     Standing Expiration Date:   3/24/2022    Microalbumin, Ur     Standing Status:   Future     Standing Expiration Date:   3/24/2022    POCT glycosylated hemoglobin (Hb A1C)    HM DIABETES FOOT EXAM     Orders Placed This Encounter   Medications    levothyroxine (SYNTHROID) 175 MCG tablet     Sig: Take 1 tablet by mouth once daily     Dispense:  90 tablet     Refill:  1    metFORMIN (GLUCOPHAGE) 500 MG tablet     Sig: Take 1 tablet by mouth 2 times daily (with meals)     Dispense:  60 tablet     Refill:  5        Return in about 6 months (around 9/24/2021) for HTN, DM, lipid, skin tags. Discussed use, benefit, and side effects of prescribed medications. All patient questions answered. Pt voiced understanding. Reviewed health maintenance pneumovax 13 encouraged, lab in 3 weeks. Instructed to continue current medications, diet and exercise. Patient agreed with treatment/plan. Follow up as directed.      Electronicallysigned by Kali Phillips MD on 3/24/2021

## 2021-03-29 ENCOUNTER — HOSPITAL ENCOUNTER (OUTPATIENT)
Age: 66
Setting detail: SPECIMEN
Discharge: HOME | End: 2021-03-29
Payer: COMMERCIAL

## 2021-03-29 ENCOUNTER — OFFICE VISIT (OUTPATIENT)
Dept: FAMILY MEDICINE CLINIC | Age: 66
End: 2021-03-29
Payer: COMMERCIAL

## 2021-03-29 VITALS
HEIGHT: 66 IN | OXYGEN SATURATION: 96 % | TEMPERATURE: 97.1 F | HEART RATE: 92 BPM | WEIGHT: 234 LBS | BODY MASS INDEX: 37.61 KG/M2

## 2021-03-29 DIAGNOSIS — D49.9 NEOPLASM: ICD-10-CM

## 2021-03-29 DIAGNOSIS — D49.9 NEOPLASM: Primary | ICD-10-CM

## 2021-03-29 PROCEDURE — 88305 TISSUE EXAM BY PATHOLOGIST: CPT

## 2021-03-29 PROCEDURE — 11403 EXC TR-EXT B9+MARG 2.1-3CM: CPT | Performed by: FAMILY MEDICINE

## 2021-03-29 NOTE — PROGRESS NOTES
Procedure and risks have been explained to patient. All questions have been addressed and consent form has been signed. 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 Anesthesia: 3ml of 1% Xylocaine with epi     Description: Consent obtained. Pt placed in the supine position. Time-out performed. Skin over lesion prepped and draped in the standard sterile fashion. Local anesthetic injected intra-dermally in the  region. Excesion of lesion accomplished with ED&C x 3 Specimen sent to Pathology. Bandaid large with neosporin applied. Pt tolerated the procedure well. Specimen: specimen sent to pathology    Complications: None, 2 ml blood loss. Disposition: Pt discharged home in good condition. Tylenol for pain control, wound care instructions and activity restrictions given to pt. Contact office in 2 weeks if has not heard from office to check on pathology. Diagnosis Orders   1.  Neoplasm      chest       Patient Instructions   2 week suture removal

## 2021-04-02 LAB — DERMATOLOGY PATHOLOGY REPORT: NORMAL

## 2021-04-12 ENCOUNTER — NURSE ONLY (OUTPATIENT)
Dept: FAMILY MEDICINE CLINIC | Age: 66
End: 2021-04-12
Payer: COMMERCIAL

## 2021-04-12 DIAGNOSIS — Z48.02 VISIT FOR SUTURE REMOVAL: Primary | ICD-10-CM

## 2021-04-23 PROBLEM — Z00.00 WELL ADULT EXAM: Status: RESOLVED | Noted: 2021-03-24 | Resolved: 2021-04-23

## 2021-07-14 ENCOUNTER — TELEPHONE (OUTPATIENT)
Dept: FAMILY MEDICINE CLINIC | Age: 66
End: 2021-07-14

## 2021-09-21 LAB
ANION GAP SERPL CALCULATED.3IONS-SCNC: 7.5 MMOL/L
CHLORIDE BLD-SCNC: 102 MMOL/L (ref 98–120)
CHOLESTEROL/HDL RATIO: 2.4 RATIO (ref 0–4.5)
CHOLESTEROL: 115 MG/DL (ref 50–200)
CO2: 28 MMOL/L (ref 22–31)
CREAT SERPL-MCNC: 1.1 MG/DL (ref 0.7–1.3)
CREATININE, RANDOM URINE: 87.2 MG/DL (ref 20–370)
GFR CALCULATED: > 60
HDLC SERPL-MCNC: 48 MG/DL (ref 36–68)
LDL CHOLESTEROL CALCULATED: 25.4 MG/DL (ref 0–160)
MICROALBUMIN UR-MCNC: 6.3 MG/DL (ref 0–1.7)
MICROALBUMIN/CREAT UR-RTO: 72.24
POTASSIUM SERPL-SCNC: 4.7 MMOL/L (ref 3.6–5)
SODIUM BLD-SCNC: 137 MMOL/L (ref 135–145)
TRIGL SERPL-MCNC: 208 MG/DL (ref 10–250)
TSH SERPL DL<=0.05 MIU/L-ACNC: 1.77 MIU/ML (ref 0.49–4.67)
VLDLC SERPL CALC-MCNC: 42 MG/DL (ref 0–50)

## 2021-09-27 ENCOUNTER — OFFICE VISIT (OUTPATIENT)
Dept: FAMILY MEDICINE CLINIC | Age: 66
End: 2021-09-27
Payer: MEDICARE

## 2021-09-27 VITALS
WEIGHT: 228 LBS | DIASTOLIC BLOOD PRESSURE: 80 MMHG | HEART RATE: 98 BPM | SYSTOLIC BLOOD PRESSURE: 120 MMHG | BODY MASS INDEX: 36.64 KG/M2 | OXYGEN SATURATION: 97 % | RESPIRATION RATE: 20 BRPM | HEIGHT: 66 IN

## 2021-09-27 DIAGNOSIS — L91.8 CUTANEOUS SKIN TAGS: ICD-10-CM

## 2021-09-27 DIAGNOSIS — E11.9 TYPE 2 DIABETES MELLITUS WITHOUT COMPLICATION, WITHOUT LONG-TERM CURRENT USE OF INSULIN (HCC): Primary | ICD-10-CM

## 2021-09-27 DIAGNOSIS — Z23 NEED FOR PNEUMOCOCCAL VACCINATION: ICD-10-CM

## 2021-09-27 DIAGNOSIS — E03.9 ACQUIRED HYPOTHYROIDISM: ICD-10-CM

## 2021-09-27 DIAGNOSIS — Z23 NEED FOR INFLUENZA VACCINATION: ICD-10-CM

## 2021-09-27 DIAGNOSIS — I10 ESSENTIAL HYPERTENSION: ICD-10-CM

## 2021-09-27 DIAGNOSIS — E78.6 HDL DEFICIENCY: ICD-10-CM

## 2021-09-27 LAB — HBA1C MFR BLD: 8.3 %

## 2021-09-27 PROCEDURE — 90694 VACC AIIV4 NO PRSRV 0.5ML IM: CPT | Performed by: FAMILY MEDICINE

## 2021-09-27 PROCEDURE — 90670 PCV13 VACCINE IM: CPT | Performed by: FAMILY MEDICINE

## 2021-09-27 PROCEDURE — PBSHW PNEUMOCOCCAL CONJUGATE VACCINE 13-VALENT IM: Performed by: FAMILY MEDICINE

## 2021-09-27 PROCEDURE — 99214 OFFICE O/P EST MOD 30 MIN: CPT | Performed by: FAMILY MEDICINE

## 2021-09-27 PROCEDURE — PBSHW POCT GLYCOSYLATED HEMOGLOBIN (HGB A1C): Performed by: FAMILY MEDICINE

## 2021-09-27 PROCEDURE — 99213 OFFICE O/P EST LOW 20 MIN: CPT

## 2021-09-27 PROCEDURE — 83036 HEMOGLOBIN GLYCOSYLATED A1C: CPT | Performed by: FAMILY MEDICINE

## 2021-09-27 PROCEDURE — 3052F HG A1C>EQUAL 8.0%<EQUAL 9.0%: CPT | Performed by: FAMILY MEDICINE

## 2021-09-27 RX ORDER — ROSUVASTATIN CALCIUM 40 MG/1
40 TABLET, COATED ORAL EVERY EVENING
COMMUNITY
End: 2022-02-16 | Stop reason: SDUPTHER

## 2021-09-27 RX ORDER — LEVOTHYROXINE SODIUM 175 UG/1
TABLET ORAL
Qty: 90 TABLET | Refills: 1 | Status: SHIPPED | OUTPATIENT
Start: 2021-09-27 | End: 2022-03-22 | Stop reason: SDUPTHER

## 2021-09-27 ASSESSMENT — ENCOUNTER SYMPTOMS: SHORTNESS OF BREATH: 0

## 2021-09-27 NOTE — PATIENT INSTRUCTIONS
Encourage further weight loss  Pneumovax 23 due next year  Reviewed if not controlled adding farxiga for renal protection also  Repeat microalbumin in 1 year

## 2021-09-27 NOTE — PROGRESS NOTES
[address] [facility]  Dept: [phone]  Dept Fax:[phone]    Robb Solano is a 73 y.o. male who presents today for his medical conditions/complaints as noted below. Robb Solano is c/o of Hypertension, Diabetes, Hyperlipidemia, and Skin Tag    HPI:     HPI  Here for follow up of HTN, DM, Hyperlipidemia and Hypothyroid  Taking all medications regularly  No side effects noted    Other complaint currently for skin tags. Questions renal function followup with microalbumin results  Still not smoking again. Weight concerns for patient. Hard to walk with foot pains and back pains.     BP Readings from Last 3 Encounters:   21 120/80   21 (!) 140/82   20 128/82          (goal 120/80)    Past Medical History:   Diagnosis Date    Hyperlipidemia     Hypothyroidism     Polymyalgia rheumatica (HCC)     Shingles       Past Surgical History:   Procedure Laterality Date    BACK SURGERY      x4    CARPAL TUNNEL RELEASE Bilateral     CATARACT REMOVAL WITH IMPLANT Bilateral 10/2017    COLONOSCOPY      EYE SURGERY  2017    Cataract    EYE SURGERY  10/2017    Cataract    HERNIA REPAIR      baby    LAMINECTOMY  2016    decompression    TONSILLECTOMY      TUMOR REMOVAL      lt arm       Family History   Problem Relation Age of Onset    Cancer Father         brain tumor    Diabetes Sister     Stroke Maternal Grandmother         68       Social History     Tobacco Use    Smoking status: Former Smoker     Packs/day: 0.50     Years: 40.00     Pack years: 20.00     Types: Cigarettes     Quit date: 10/5/2020     Years since quittin.9    Smokeless tobacco: Never Used   Substance Use Topics    Alcohol use: Not on file      Current Outpatient Medications   Medication Sig Dispense Refill    rosuvastatin (CRESTOR) 40 MG tablet Take 40 mg by mouth every evening      lisinopril (PRINIVIL;ZESTRIL) 10 MG tablet Take 1 tablet by mouth once daily 90 tablet 1    levothyroxine (SYNTHROID) 175 MCG tablet Take 1 tablet by mouth once daily 90 tablet 1    metFORMIN (GLUCOPHAGE) 500 MG tablet Take 1 tablet by mouth 2 times daily (with meals) 60 tablet 5    aspirin 81 MG chewable tablet One dialy 100 tablet 3    polyethylene glycol (GLYCOLAX) powder Take 17 g by mouth daily       No current facility-administered medications for this visit. Allergies   Allergen Reactions    Morphine      Burning,tingling         Health Maintenance   Topic Date Due    Diabetic retinal exam  Never done    HIV screen  Never done    Low dose CT lung screening  Never done    Pneumococcal 65+ years Vaccine (1 of 1 - PPSV23) 01/06/2021    Flu vaccine (1) 09/01/2021    Diabetic foot exam  03/24/2022    Diabetic microalbuminuria test  09/21/2022    Lipid screen  09/21/2022    TSH testing  09/21/2022    Potassium monitoring  09/21/2022    Creatinine monitoring  09/21/2022    A1C test (Diabetic or Prediabetic)  09/27/2022    Colon cancer screen colonoscopy  09/21/2025    DTaP/Tdap/Td vaccine (2 - Td or Tdap) 06/04/2029    Shingles Vaccine  Completed    COVID-19 Vaccine  Completed    AAA screen  Completed    Hepatitis C screen  Completed    Hepatitis A vaccine  Aged Out    Hib vaccine  Aged Out    Meningococcal (ACWY) vaccine  Aged Out       Subjective:      Review of Systems   Constitutional: Negative for fatigue. Eyes: Negative for visual disturbance. Respiratory: Negative for shortness of breath. Cardiovascular: Negative for chest pain, palpitations and leg swelling. Genitourinary: Negative for frequency. Neurological: Negative for dizziness and headaches. Home BP Checks? yes  Medication Compliant?  yes    Objective:     /80 (Site: Left Upper Arm, Position: Sitting, Cuff Size: Large Adult)   Pulse 98   Resp 20   Ht 5' 6\" (1.676 m)   Wt 228 lb (103.4 kg)   SpO2 97%   BMI 36.80 kg/m²   Physical Exam  Vitals reviewed. Constitutional:       General: He is not in acute distress. Appearance: He is well-developed. HENT:      Head: Atraumatic. Eyes:      Conjunctiva/sclera: Conjunctivae normal.   Neck:      Thyroid: No thyromegaly. Vascular: No carotid bruit. Cardiovascular:      Rate and Rhythm: Normal rate and regular rhythm. Heart sounds: No murmur heard. Pulmonary:      Effort: Pulmonary effort is normal.      Breath sounds: Normal breath sounds. Abdominal:      General: Bowel sounds are normal.      Palpations: Abdomen is soft. Musculoskeletal:         General: No swelling (BLE). Cervical back: Neck supple. Right lower leg: No swelling. Left lower leg: No swelling. Lymphadenopathy:      Cervical: No cervical adenopathy. Skin:     Findings: Lesion (8 skin tags on neck) present. Neurological:      Mental Status: He is alert and oriented to person, place, and time. Psychiatric:         Thought Content:  Thought content normal.         Judgment: Judgment normal.         Lab Results   Component Value Date     09/21/2021    K 4.7 09/21/2021     09/21/2021    CO2 28 09/21/2021     Lab Results   Component Value Date    CREATININE 1.1 09/21/2021     Lab Results   Component Value Date    CHOL 115 09/21/2021    CHOL 115 10/12/2019    CHOL 163 05/28/2019     Lab Results   Component Value Date    TRIG 208 09/21/2021    TRIG 201 10/12/2019    TRIG 910 (H) 05/28/2019     Lab Results   Component Value Date    HDL 48 09/21/2021    HDL 36 09/16/2016    HDL 36 06/17/2016     Lab Results   Component Value Date    LDLCALC 25.4 09/21/2021    LDLCALC 36.8 10/12/2019    LDLCALC -52.0 05/28/2019     Lab Results   Component Value Date    VLDL 42 09/21/2021    VLDL 40 10/12/2019    VLDL 182 05/28/2019     Lab Results   Component Value Date    CHOLHDLRATIO 2.40 09/21/2021    CHOLHDLRATIO 3.0 10/12/2019    CHOLHDLRATIO 4.9 (H) 05/28/2019     Lab Results   Component Value Date    TSH

## 2021-10-05 ENCOUNTER — OFFICE VISIT (OUTPATIENT)
Dept: FAMILY MEDICINE CLINIC | Age: 66
End: 2021-10-05
Payer: MEDICARE

## 2021-10-05 VITALS
HEART RATE: 102 BPM | WEIGHT: 229.2 LBS | BODY MASS INDEX: 36.83 KG/M2 | SYSTOLIC BLOOD PRESSURE: 132 MMHG | HEIGHT: 66 IN | OXYGEN SATURATION: 96 % | DIASTOLIC BLOOD PRESSURE: 74 MMHG

## 2021-10-05 DIAGNOSIS — L91.8 CUTANEOUS SKIN TAGS: Primary | ICD-10-CM

## 2021-10-05 PROCEDURE — 11200 RMVL SKIN TAGS UP TO&INC 15: CPT | Performed by: FAMILY MEDICINE

## 2021-10-05 PROCEDURE — 17111 DESTRUCTION B9 LESIONS 15/>: CPT | Performed by: FAMILY MEDICINE

## 2021-10-05 PROCEDURE — 11201 RMVL SKIN TAGS EA ADDL 10: CPT | Performed by: FAMILY MEDICINE

## 2021-10-05 NOTE — PROGRESS NOTES
[address]  Dept: [phone]  Dept Fax: [phone]    8 skin tags on neck,  issues in axilla 15 on left axilla    Freeze Procedure Note    Pre-operative Diagnosis:skin tags    Post-operative Diagnosis: same    Locations:neck, axilla    Indications: irritated    Procedure Details   Patient informed of the risks, natural healing and benefits of the   procedure and informed consent obtained. Number of lesions treated: 23    The lesion and surrounding area was frozen with verruca freeze x 3. The patient tolerated the procedure well. Condition:  Stable    Complications:  none. Plan:  1. Instructed to monitor for natural healing. 2. Warning signs of infection were reviewed.

## 2021-10-21 ENCOUNTER — PROCEDURE VISIT (OUTPATIENT)
Dept: FAMILY MEDICINE CLINIC | Age: 66
End: 2021-10-21
Payer: MEDICARE

## 2021-10-21 VITALS
BODY MASS INDEX: 35.79 KG/M2 | TEMPERATURE: 98 F | SYSTOLIC BLOOD PRESSURE: 112 MMHG | WEIGHT: 228 LBS | HEIGHT: 67 IN | OXYGEN SATURATION: 99 % | RESPIRATION RATE: 24 BRPM | HEART RATE: 109 BPM | DIASTOLIC BLOOD PRESSURE: 60 MMHG

## 2021-10-21 DIAGNOSIS — D49.9 NEOPLASM: ICD-10-CM

## 2021-10-21 DIAGNOSIS — L91.8 CUTANEOUS SKIN TAGS: Primary | ICD-10-CM

## 2021-10-21 PROCEDURE — 11200 RMVL SKIN TAGS UP TO&INC 15: CPT | Performed by: FAMILY MEDICINE

## 2021-10-21 PROCEDURE — 11201 RMVL SKIN TAGS EA ADDL 10: CPT | Performed by: FAMILY MEDICINE

## 2021-10-21 SDOH — ECONOMIC STABILITY: FOOD INSECURITY: WITHIN THE PAST 12 MONTHS, THE FOOD YOU BOUGHT JUST DIDN'T LAST AND YOU DIDN'T HAVE MONEY TO GET MORE.: NEVER TRUE

## 2021-10-21 SDOH — ECONOMIC STABILITY: FOOD INSECURITY: WITHIN THE PAST 12 MONTHS, YOU WORRIED THAT YOUR FOOD WOULD RUN OUT BEFORE YOU GOT MONEY TO BUY MORE.: NEVER TRUE

## 2021-10-21 ASSESSMENT — PATIENT HEALTH QUESTIONNAIRE - PHQ9
1. LITTLE INTEREST OR PLEASURE IN DOING THINGS: 0
2. FEELING DOWN, DEPRESSED OR HOPELESS: 0
SUM OF ALL RESPONSES TO PHQ QUESTIONS 1-9: 0
SUM OF ALL RESPONSES TO PHQ9 QUESTIONS 1 & 2: 0

## 2021-10-21 ASSESSMENT — ENCOUNTER SYMPTOMS
ROS SKIN COMMENTS: SKIN TAGS
DIARRHEA: 0
CONSTIPATION: 1
SHORTNESS OF BREATH: 0
COUGH: 0
SINUS PRESSURE: 0

## 2021-10-21 ASSESSMENT — SOCIAL DETERMINANTS OF HEALTH (SDOH): HOW HARD IS IT FOR YOU TO PAY FOR THE VERY BASICS LIKE FOOD, HOUSING, MEDICAL CARE, AND HEATING?: NOT HARD AT ALL

## 2021-10-27 ENCOUNTER — HOSPITAL ENCOUNTER (OUTPATIENT)
Age: 66
Setting detail: SPECIMEN
Discharge: HOME | End: 2021-10-27
Payer: MEDICARE

## 2021-10-27 ENCOUNTER — PROCEDURE VISIT (OUTPATIENT)
Dept: FAMILY MEDICINE CLINIC | Age: 66
End: 2021-10-27
Payer: MEDICARE

## 2021-10-27 VITALS
OXYGEN SATURATION: 96 % | DIASTOLIC BLOOD PRESSURE: 74 MMHG | HEART RATE: 95 BPM | SYSTOLIC BLOOD PRESSURE: 128 MMHG | WEIGHT: 229 LBS | BODY MASS INDEX: 35.94 KG/M2 | HEIGHT: 67 IN

## 2021-10-27 DIAGNOSIS — D49.9 NEOPLASM: Primary | ICD-10-CM

## 2021-10-27 PROCEDURE — 11305 SHAVE SKIN LESION 0.5 CM/<: CPT | Performed by: FAMILY MEDICINE

## 2021-10-27 NOTE — PROGRESS NOTES
Procedure and risks have been explained to patient. All questions have been addressed and consent form has been signed. Procedure Note     Single lesion left neck without resolution following freeze for skin tags. Lesion is broad based 5 mm. Dome shaped without telangectasia. Pre-op diagnosis: neoplasm  Post-op diagnosis: same    Procedure:Shave of left neck lesion    Surgeon: Rashmi Phoenix. Anesthesia: 3ml of 1% Xylocaine plain     Description: Consent obtained. Pt placed in the supine position. Time-out performed. Skin over lesion prepped and draped in the standard sterile fashion. Local anesthetic injected intra-dermally in the  region. Shave of lesion accomplished with ED&C x 3 Specimen sent to Pathology. Bandaid with neosporin applied. Pt tolerated the procedure well. Specimen: specimen sent to pathology    Complications: None     Disposition: Pt discharged home in good condition. Tylenol for pain control, wound care instructions and activity restrictions given to pt. Contact office in 2 weeks if has not heard from office to check on pathology. Diagnosis Orders   1. Neoplasm  Surgical Pathology       There are no Patient Instructions on file for this visit.

## 2021-11-01 LAB — DERMATOLOGY PATHOLOGY REPORT: NORMAL

## 2021-12-09 DIAGNOSIS — I10 ESSENTIAL HYPERTENSION: ICD-10-CM

## 2021-12-09 RX ORDER — LISINOPRIL 10 MG/1
10 TABLET ORAL DAILY
Qty: 90 TABLET | Refills: 1 | Status: SHIPPED | OUTPATIENT
Start: 2021-12-09 | End: 2022-06-07 | Stop reason: SDUPTHER

## 2021-12-09 NOTE — TELEPHONE ENCOUNTER
Selene Vee is requesting a refill on the following medication(s):  Requested Prescriptions     Pending Prescriptions Disp Refills    lisinopril (PRINIVIL;ZESTRIL) 10 MG tablet 90 tablet 1       Last Visit Date (If Applicable):  47/09/8084    Next Visit Date:    12/16/21 - to establish w/ Dr. Juan Luis Mcnamara

## 2022-01-05 ENCOUNTER — OFFICE VISIT (OUTPATIENT)
Dept: FAMILY MEDICINE CLINIC | Age: 67
End: 2022-01-05
Payer: MEDICARE

## 2022-01-05 VITALS
DIASTOLIC BLOOD PRESSURE: 82 MMHG | SYSTOLIC BLOOD PRESSURE: 136 MMHG | HEART RATE: 95 BPM | OXYGEN SATURATION: 96 % | BODY MASS INDEX: 35.61 KG/M2 | WEIGHT: 224 LBS

## 2022-01-05 DIAGNOSIS — H61.23 BILATERAL IMPACTED CERUMEN: ICD-10-CM

## 2022-01-05 DIAGNOSIS — G62.9 PERIPHERAL POLYNEUROPATHY: ICD-10-CM

## 2022-01-05 DIAGNOSIS — E03.9 HYPOTHYROIDISM, UNSPECIFIED TYPE: ICD-10-CM

## 2022-01-05 DIAGNOSIS — E11.9 TYPE 2 DIABETES MELLITUS WITHOUT COMPLICATION, WITHOUT LONG-TERM CURRENT USE OF INSULIN (HCC): Primary | ICD-10-CM

## 2022-01-05 DIAGNOSIS — Z00.00 PHYSICAL EXAM: ICD-10-CM

## 2022-01-05 DIAGNOSIS — E78.5 HYPERLIPIDEMIA, UNSPECIFIED HYPERLIPIDEMIA TYPE: ICD-10-CM

## 2022-01-05 LAB
CHP ED QC CHECK: NORMAL
GLUCOSE BLD-MCNC: 162 MG/DL
HBA1C MFR BLD: 7.5 %

## 2022-01-05 PROCEDURE — 3051F HG A1C>EQUAL 7.0%<8.0%: CPT | Performed by: FAMILY MEDICINE

## 2022-01-05 PROCEDURE — 82962 GLUCOSE BLOOD TEST: CPT | Performed by: FAMILY MEDICINE

## 2022-01-05 PROCEDURE — 83036 HEMOGLOBIN GLYCOSYLATED A1C: CPT | Performed by: FAMILY MEDICINE

## 2022-01-05 PROCEDURE — 99213 OFFICE O/P EST LOW 20 MIN: CPT

## 2022-01-05 PROCEDURE — PBSHW POCT GLYCOSYLATED HEMOGLOBIN (HGB A1C): Performed by: FAMILY MEDICINE

## 2022-01-05 PROCEDURE — PBSHW POCT GLUCOSE: Performed by: FAMILY MEDICINE

## 2022-01-05 PROCEDURE — 69210 REMOVE IMPACTED EAR WAX UNI: CPT | Performed by: FAMILY MEDICINE

## 2022-01-05 PROCEDURE — 99214 OFFICE O/P EST MOD 30 MIN: CPT | Performed by: FAMILY MEDICINE

## 2022-01-05 RX ORDER — LANCETS 30 GAUGE
1 EACH MISCELLANEOUS DAILY
Qty: 100 EACH | Refills: 1 | Status: SHIPPED | OUTPATIENT
Start: 2022-01-05 | End: 2022-03-09 | Stop reason: SDUPTHER

## 2022-01-05 RX ORDER — GLUCOSAMINE HCL/CHONDROITIN SU 500-400 MG
CAPSULE ORAL
Qty: 100 STRIP | Refills: 0 | Status: SHIPPED | OUTPATIENT
Start: 2022-01-05 | End: 2022-03-09 | Stop reason: SDUPTHER

## 2022-01-05 RX ORDER — BLOOD-GLUCOSE METER
1 EACH MISCELLANEOUS DAILY
Qty: 1 KIT | Refills: 0 | Status: SHIPPED | OUTPATIENT
Start: 2022-01-05 | End: 2022-03-10 | Stop reason: SDUPTHER

## 2022-01-05 ASSESSMENT — ENCOUNTER SYMPTOMS
SHORTNESS OF BREATH: 0
ABDOMINAL PAIN: 0

## 2022-01-05 NOTE — PROGRESS NOTES
@[facility]@         Name: Manjit Charles  DOS: 2022  MRN: [id number]        Subjective:  Manjit Charles is a 73 y.o. male being seen for   Chief Complaint   Patient presents with   Hector Gamboa New Doctor    Diabetes     patient is here today his metformin that Dr. Gurrola Esters increased on his last visit. Patient just wants to follow up and see what his a1c is.  Nasal Congestion     patient has had a cough and runny nose for over a week. He is feeling much improved.        Vitals:    22 0851   BP: 136/82   Pulse:    SpO2:      Allergies   Allergen Reactions    Morphine      Burning,tingling       Past Medical History:   Diagnosis Date    DM2 (diabetes mellitus, type 2) (HCC)     Hyperlipidemia     Hypothyroidism     Polymyalgia rheumatica (HCC)     Shingles      Past Surgical History:   Procedure Laterality Date    BACK SURGERY      x4    CARPAL TUNNEL RELEASE Bilateral     CATARACT REMOVAL WITH IMPLANT Bilateral 10/2017    COLONOSCOPY      EYE SURGERY  2017    Cataract    EYE SURGERY  10/2017    Cataract    HERNIA REPAIR      baby    LAMINECTOMY  2016    decompression    TONSILLECTOMY      TUMOR REMOVAL      lt arm     Social History     Socioeconomic History    Marital status:      Spouse name: None    Number of children: None    Years of education: None    Highest education level: None   Occupational History    None   Tobacco Use    Smoking status: Former Smoker     Packs/day: 0.50     Years: 40.00     Pack years: 20.00     Types: Cigarettes     Quit date: 10/5/2020     Years since quittin.2    Smokeless tobacco: Never Used   Substance and Sexual Activity    Alcohol use: None    Drug use: None    Sexual activity: None   Other Topics Concern    None   Social History Narrative    None     Social Determinants of Health     Financial Resource Strain: Low Risk     Difficulty of Paying Living Expenses: Not hard at all   Food Insecurity: No Food Insecurity  Worried About Running Out of Food in the Last Year: Never true    Jose of Food in the Last Year: Never true   Transportation Needs: No Transportation Needs    Lack of Transportation (Medical): No    Lack of Transportation (Non-Medical): No   Physical Activity:     Days of Exercise per Week: Not on file    Minutes of Exercise per Session: Not on file   Stress:     Feeling of Stress : Not on file   Social Connections:     Frequency of Communication with Friends and Family: Not on file    Frequency of Social Gatherings with Friends and Family: Not on file    Attends Mandaeism Services: Not on file    Active Member of [address] [place] [address] [employer] or Organizations: Not on file    Attends Club or Organization Meetings: Not on file    Marital Status: Not on file   Intimate Partner Violence:     Fear of Current or Ex-Partner: Not on file    Emotionally Abused: Not on file    Physically Abused: Not on file    Sexually Abused: Not on file   Housing Stability:     Unable to Pay for Housing in the Last Year: Not on file    Number of [place] in the Last Year: Not on file    Unstable Housing in the Last Year: Not on file       Current Outpatient Medications   Medication Sig Dispense Refill    neomycin-polymyxin-hydrocortisone (CORTISPORIN) 3.5-70588-7 otic solution Place 4 drops into both ears 3 times daily for 7 days Use the one bottle for both ears medication is gone which will be about 5 days.  10 mL 0    lisinopril (PRINIVIL;ZESTRIL) 10 MG tablet Take 1 tablet by mouth daily 90 tablet 1    rosuvastatin (CRESTOR) 40 MG tablet Take 40 mg by mouth every evening      metFORMIN (GLUCOPHAGE) 500 MG tablet Take 2 tablets by mouth 2 times daily (with meals) 360 tablet 1    levothyroxine (SYNTHROID) 175 MCG tablet Take 1 tablet by mouth once daily 90 tablet 1    aspirin 81 MG chewable tablet One dialy 100 tablet 3    polyethylene glycol (GLYCOLAX) powder Take 17 g by mouth daily       No current facility-administered medications for this visit. Objective:  Patient is also here for f/u HTN and hypperlipidemia    Diabetes  He presents for his follow-up diabetic visit. He has type 2 diabetes mellitus. His disease course has been stable. Pertinent negatives for hypoglycemia include no dizziness. Pertinent negatives for diabetes include no chest pain, no fatigue and no weakness. (Patient states he has peripheral neuropathy of his feet B/L but is due to lumbar back surgery. ) Symptoms are stable. Current diabetic treatment includes diet and oral agent (monotherapy). Review of Systems   Constitutional: Negative for fatigue and unexpected weight change. Eyes: Negative for visual disturbance. Respiratory: Negative for shortness of breath. Cardiovascular: Negative for chest pain, palpitations and leg swelling. Gastrointestinal: Negative for abdominal pain. Genitourinary: Negative for difficulty urinating. Musculoskeletal: Negative for arthralgias and myalgias. Skin: Negative for rash and wound. Neurological: Negative for dizziness and weakness. Physical Exam  Constitutional:       Appearance: Normal appearance. HENT:      Head: Normocephalic and atraumatic. Right Ear: Tympanic membrane and external ear normal. There is impacted cerumen. Left Ear: Tympanic membrane and external ear normal. There is impacted cerumen. Ears:      Comments: After cerumen removal, both ears were cleared and TM intact. Nose: Nose normal.      Mouth/Throat:      Mouth: Mucous membranes are moist.      Pharynx: Oropharynx is clear. Eyes:      Extraocular Movements: Extraocular movements intact. Conjunctiva/sclera: Conjunctivae normal.      Pupils: Pupils are equal, round, and reactive to light. Cardiovascular:      Rate and Rhythm: Normal rate and regular rhythm. Pulses: Normal pulses. Heart sounds: Normal heart sounds. No murmur heard.       Pulmonary:      Effort: Pulmonary effort is normal. Breath sounds: Normal breath sounds. Abdominal:      General: Abdomen is flat. Bowel sounds are normal.      Palpations: Abdomen is soft. Musculoskeletal:         General: Normal range of motion. Cervical back: Normal range of motion and neck supple. Lymphadenopathy:      Cervical: No cervical adenopathy. Skin:     General: Skin is warm. Capillary Refill: Capillary refill takes less than 2 seconds. Neurological:      General: No focal deficit present. Mental Status: He is alert and oriented to person, place, and time. Psychiatric:         Mood and Affect: Mood normal.         Behavior: Behavior normal.         Thought Content: Thought content normal.          Assessment:   Diagnosis Orders   1. Type 2 diabetes mellitus without complication, without long-term current use of insulin (HCC)  Comprehensive Metabolic Panel, Fasting    POCT Hb A1C (glycosylated hemoglobin)    Comprehensive Metabolic Panel, Fasting    Hemoglobin A1C    Microalbumin, Ur    POCT Glucose   2. Hyperlipidemia, unspecified hyperlipidemia type  Lipid, Fasting    Lipid, Fasting   3. Bilateral impacted cerumen  84002 - [place] REMOVE IMPACTED EAR WAX   4. Hypothyroidism, unspecified type  TSH   5. Physical exam  PSA Screening   6.  Peripheral polyneuropathy           Plan:     Orders Placed This Encounter   Procedures    Comprehensive Metabolic Panel, Fasting     Standing Status:   Future     Standing Expiration Date:   1/5/2023    Lipid, Fasting     Standing Status:   Future     Standing Expiration Date:   1/5/2023    Comprehensive Metabolic Panel, Fasting     Standing Status:   Future     Standing Expiration Date:   1/5/2023    Lipid, Fasting     Standing Status:   Future     Standing Expiration Date:   1/5/2023    TSH     Standing Status:   Future     Standing Expiration Date:   1/5/2023    Hemoglobin A1C     Standing Status:   Future     Standing Expiration Date:   1/5/2023    PSA Screening     Standing Status: Future     Standing Expiration Date:   1/5/2023    Microalbumin, Ur     Standing Status:   Future     Standing Expiration Date:   1/5/2023    POCT Hb A1C (glycosylated hemoglobin)    POCT Glucose    [address]606 - [place] REMOVE IMPACTED EAR WAX       DM:   NO sugar, decrease fruit intake, No juice, NO veggies with color other than green, NO sauteed onions or anything that caramelizes, you may eat raw onions. NO alcohol, try not to eat diabetic candies as they may cause diarrhea. Minimize your carbohydrate intake. Directions to check sugar levels: One touch glucometer strips, lancets and alcohol pads, check your fasting sugar when you wake up, 2 hrs after lunch then 2 hrs after dinner and write it down on a piece of paper. NO sugar whatsoever. Keep a food diary. Write down everything you eat and drink every day      Return in about 3 months (around 4/5/2022).      Kaiden Archer, DO

## 2022-01-11 DIAGNOSIS — E03.9 HYPOTHYROIDISM, UNSPECIFIED TYPE: ICD-10-CM

## 2022-01-11 DIAGNOSIS — E11.9 TYPE 2 DIABETES MELLITUS WITHOUT COMPLICATION, WITHOUT LONG-TERM CURRENT USE OF INSULIN (HCC): ICD-10-CM

## 2022-01-11 DIAGNOSIS — Z00.00 PHYSICAL EXAM: ICD-10-CM

## 2022-01-11 DIAGNOSIS — E78.5 HYPERLIPIDEMIA, UNSPECIFIED HYPERLIPIDEMIA TYPE: ICD-10-CM

## 2022-02-15 NOTE — TELEPHONE ENCOUNTER
Aleshia Archer is requesting a refill on the following medication(s):  Requested Prescriptions     Pending Prescriptions Disp Refills    rosuvastatin (CRESTOR) 40 MG tablet 30 tablet      Sig: Take 1 tablet by mouth every evening       Last Visit Date (If Applicable):  7/2/3794    Next Visit Date:    4/18/2022

## 2022-02-16 RX ORDER — ROSUVASTATIN CALCIUM 40 MG/1
40 TABLET, COATED ORAL EVERY EVENING
Qty: 30 TABLET | Refills: 2 | Status: SHIPPED | OUTPATIENT
Start: 2022-02-16 | End: 2022-05-24

## 2022-03-09 DIAGNOSIS — E11.9 TYPE 2 DIABETES MELLITUS WITHOUT COMPLICATION, WITHOUT LONG-TERM CURRENT USE OF INSULIN (HCC): ICD-10-CM

## 2022-03-09 RX ORDER — LANCETS 30 GAUGE
1 EACH MISCELLANEOUS DAILY
Qty: 100 EACH | Refills: 1 | Status: SHIPPED | OUTPATIENT
Start: 2022-03-09 | End: 2022-05-24 | Stop reason: SDUPTHER

## 2022-03-09 RX ORDER — GLUCOSAMINE HCL/CHONDROITIN SU 500-400 MG
CAPSULE ORAL
Qty: 100 STRIP | Refills: 0 | Status: SHIPPED | OUTPATIENT
Start: 2022-03-09 | End: 2022-05-24 | Stop reason: SDUPTHER

## 2022-03-09 NOTE — TELEPHONE ENCOUNTER
Nilton Milligan is requesting a refill on the following medication(s):  Requested Prescriptions     Pending Prescriptions Disp Refills    Lancets MISC 100 each 1     Si each by Does not apply route daily    blood glucose monitor strips 100 strip 0     Sig: Test every 8 hrs.        Last Visit Date (If Applicable):  4464    Next Visit Date:    2022

## 2022-03-09 NOTE — TELEPHONE ENCOUNTER
One touch ultra 2 - machine  One touch delica plus - lancets  One touch ultra - are the test strips    These are what is now covered by his insurance the prescriptions we sent before were not.  Can we send these to [facility]

## 2022-03-09 NOTE — TELEPHONE ENCOUNTER
Please call in to [facility] these prescriptions as it is impossible to electronically send thank you gagan

## 2022-03-10 DIAGNOSIS — E11.9 TYPE 2 DIABETES MELLITUS WITHOUT COMPLICATION, WITHOUT LONG-TERM CURRENT USE OF INSULIN (HCC): Primary | ICD-10-CM

## 2022-03-10 RX ORDER — BLOOD-GLUCOSE METER
1 EACH MISCELLANEOUS DAILY
Qty: 1 KIT | Refills: 0 | Status: SHIPPED | OUTPATIENT
Start: 2022-03-10

## 2022-03-22 DIAGNOSIS — E03.9 ACQUIRED HYPOTHYROIDISM: ICD-10-CM

## 2022-03-22 RX ORDER — LEVOTHYROXINE SODIUM 175 UG/1
TABLET ORAL
Qty: 90 TABLET | Refills: 1 | Status: SHIPPED | OUTPATIENT
Start: 2022-03-22 | End: 2022-09-02

## 2022-03-22 NOTE — TELEPHONE ENCOUNTER
Cielo Johnston is requesting a refill on the following medication(s):  Requested Prescriptions     Pending Prescriptions Disp Refills    levothyroxine (SYNTHROID) 175 MCG tablet 90 tablet 1     Sig: Take 1 tablet by mouth once daily       Last Visit Date (If Applicable):  5/8/6090    Next Visit Date:    4/18/2022

## 2022-04-04 LAB
ALBUMIN/GLOBULIN RATIO: 1.6 G/DL
ALBUMIN: 3.9 G/DL (ref 3.5–5)
ALP BLD-CCNC: 39 UNITS/L (ref 38–126)
ALT SERPL-CCNC: 17 UNITS/L (ref 4–50)
ANION GAP SERPL CALCULATED.3IONS-SCNC: 9.1 MMOL/L
AST SERPL-CCNC: 21 UNITS/L (ref 17–59)
BILIRUB SERPL-MCNC: 0.5 MG/DL (ref 0.2–1.3)
BUN BLDV-MCNC: 18 MG/DL (ref 9–20)
CALCIUM SERPL-MCNC: 9.7 MG/DL (ref 8.4–10.2)
CHLORIDE BLD-SCNC: 104 MMOL/L (ref 98–120)
CHOLESTEROL/HDL RATIO: 2.25 RATIO (ref 0–4.5)
CHOLESTEROL: 90 MG/DL (ref 50–200)
CO2: 30 MMOL/L (ref 22–31)
CREAT SERPL-MCNC: 1 MG/DL (ref 0.7–1.3)
GFR CALCULATED: > 60
GLOBULIN: 2.4 G/DL
GLUCOSE: 152 MG/DL (ref 75–110)
HDLC SERPL-MCNC: 40 MG/DL (ref 36–68)
LDL CHOLESTEROL CALCULATED: 21.6 MG/DL (ref 0–160)
POTASSIUM SERPL-SCNC: 5.1 MMOL/L (ref 3.6–5)
SODIUM BLD-SCNC: 138 MMOL/L (ref 135–145)
TOTAL PROTEIN, SERUM: 6.3 G/DL (ref 6.3–8.2)
TRIGL SERPL-MCNC: 142 MG/DL (ref 10–250)
VLDLC SERPL CALC-MCNC: 28 MG/DL (ref 0–50)

## 2022-04-15 DIAGNOSIS — E11.9 TYPE 2 DIABETES MELLITUS WITHOUT COMPLICATION, WITHOUT LONG-TERM CURRENT USE OF INSULIN (HCC): ICD-10-CM

## 2022-04-15 NOTE — TELEPHONE ENCOUNTER
Kellyjaved Bolaños is requesting a refill on the following medication(s):  Requested Prescriptions     Pending Prescriptions Disp Refills    metFORMIN (GLUCOPHAGE) 500 MG tablet 360 tablet 1     Sig: Take 2 tablets by mouth 2 times daily (with meals)       Last Visit Date (If Applicable):  8/4/8261    Next Visit Date:    4/18/2022

## 2022-04-18 ENCOUNTER — OFFICE VISIT (OUTPATIENT)
Dept: FAMILY MEDICINE CLINIC | Age: 67
End: 2022-04-18
Payer: MEDICARE

## 2022-04-18 VITALS
HEIGHT: 67 IN | HEART RATE: 71 BPM | SYSTOLIC BLOOD PRESSURE: 126 MMHG | TEMPERATURE: 97.8 F | BODY MASS INDEX: 33.68 KG/M2 | OXYGEN SATURATION: 96 % | DIASTOLIC BLOOD PRESSURE: 62 MMHG | WEIGHT: 214.6 LBS | RESPIRATION RATE: 16 BRPM

## 2022-04-18 DIAGNOSIS — Z13.6 ENCOUNTER FOR ABDOMINAL AORTIC ANEURYSM (AAA) SCREENING: ICD-10-CM

## 2022-04-18 DIAGNOSIS — E78.1 HYPERTRIGLYCERIDEMIA: ICD-10-CM

## 2022-04-18 DIAGNOSIS — I10 ESSENTIAL HYPERTENSION: ICD-10-CM

## 2022-04-18 DIAGNOSIS — E11.9 TYPE 2 DIABETES MELLITUS WITHOUT COMPLICATION, WITHOUT LONG-TERM CURRENT USE OF INSULIN (HCC): Primary | ICD-10-CM

## 2022-04-18 DIAGNOSIS — E03.9 ACQUIRED HYPOTHYROIDISM: ICD-10-CM

## 2022-04-18 DIAGNOSIS — Z87.891 PERSONAL HISTORY OF TOBACCO USE: ICD-10-CM

## 2022-04-18 LAB — HBA1C MFR BLD: 6.7 %

## 2022-04-18 PROCEDURE — 83036 HEMOGLOBIN GLYCOSYLATED A1C: CPT | Performed by: FAMILY MEDICINE

## 2022-04-18 PROCEDURE — PBSHW POCT GLYCOSYLATED HEMOGLOBIN (HGB A1C): Performed by: FAMILY MEDICINE

## 2022-04-18 PROCEDURE — 3044F HG A1C LEVEL LT 7.0%: CPT | Performed by: FAMILY MEDICINE

## 2022-04-18 PROCEDURE — 99214 OFFICE O/P EST MOD 30 MIN: CPT | Performed by: FAMILY MEDICINE

## 2022-04-18 PROCEDURE — 99213 OFFICE O/P EST LOW 20 MIN: CPT | Performed by: FAMILY MEDICINE

## 2022-04-18 PROCEDURE — G0296 VISIT TO DETERM LDCT ELIG: HCPCS | Performed by: FAMILY MEDICINE

## 2022-04-18 SDOH — ECONOMIC STABILITY: FOOD INSECURITY: WITHIN THE PAST 12 MONTHS, THE FOOD YOU BOUGHT JUST DIDN'T LAST AND YOU DIDN'T HAVE MONEY TO GET MORE.: NEVER TRUE

## 2022-04-18 SDOH — ECONOMIC STABILITY: FOOD INSECURITY: WITHIN THE PAST 12 MONTHS, YOU WORRIED THAT YOUR FOOD WOULD RUN OUT BEFORE YOU GOT MONEY TO BUY MORE.: NEVER TRUE

## 2022-04-18 ASSESSMENT — PATIENT HEALTH QUESTIONNAIRE - PHQ9
SUM OF ALL RESPONSES TO PHQ QUESTIONS 1-9: 0
1. LITTLE INTEREST OR PLEASURE IN DOING THINGS: 0
SUM OF ALL RESPONSES TO PHQ QUESTIONS 1-9: 0
SUM OF ALL RESPONSES TO PHQ QUESTIONS 1-9: 0
SUM OF ALL RESPONSES TO PHQ9 QUESTIONS 1 & 2: 0
SUM OF ALL RESPONSES TO PHQ QUESTIONS 1-9: 0
2. FEELING DOWN, DEPRESSED OR HOPELESS: 0

## 2022-04-18 ASSESSMENT — ENCOUNTER SYMPTOMS
PHOTOPHOBIA: 0
CHEST TIGHTNESS: 0
SHORTNESS OF BREATH: 0
WHEEZING: 0
ABDOMINAL PAIN: 0
COUGH: 0

## 2022-04-18 ASSESSMENT — SOCIAL DETERMINANTS OF HEALTH (SDOH): HOW HARD IS IT FOR YOU TO PAY FOR THE VERY BASICS LIKE FOOD, HOUSING, MEDICAL CARE, AND HEATING?: NOT VERY HARD

## 2022-04-18 NOTE — PROGRESS NOTES
Name: Bev Higgins  DOS: 2022  MRN: [id number]      Subjective:  Bev Higgins is a 78 y.o. male being seen for   Chief Complaint   Patient presents with    3 Month Follow-Up     Hypertension, Hypothyroid- last TSH 22= 2.56, Hypertriglyceridemia-last lipids 22. Review labs from 22.         Vitals:    22 0839   BP: 126/62   Pulse: 71   Resp: 16   Temp: 97.8 °F (36.6 °C)   SpO2: 96%     Allergies   Allergen Reactions    Morphine      Burning,tingling       Past Medical History:   Diagnosis Date    DM2 (diabetes mellitus, type 2) (HCC)     Hyperlipidemia     Hypothyroidism     Polymyalgia rheumatica (HCC)     Shingles      Past Surgical History:   Procedure Laterality Date    BACK SURGERY      x4    CARPAL TUNNEL RELEASE Bilateral     CATARACT REMOVAL WITH IMPLANT Bilateral 10/2017    COLONOSCOPY      EYE SURGERY  2017    Cataract    EYE SURGERY  10/2017    Cataract    HERNIA REPAIR      baby    LAMINECTOMY  2016    decompression    TONSILLECTOMY      TUMOR REMOVAL      lt arm     Social History     Socioeconomic History    Marital status:      Spouse name: Not on file    Number of children: Not on file    Years of education: Not on file    Highest education level: Not on file   Occupational History    Not on file   Tobacco Use    Smoking status: Former Smoker     Packs/day: 0.50     Years: 40.00     Pack years: 20.00     Types: Cigarettes     Quit date: 10/5/2020     Years since quittin.5    Smokeless tobacco: Never Used   Substance and Sexual Activity    Alcohol use: Not on file    Drug use: Not on file    Sexual activity: Not on file   Other Topics Concern    Not on file   Social History Narrative    Not on file     Social Determinants of Health     Financial Resource Strain: Low Risk     Difficulty of Paying Living Expenses: Not very hard   Food Insecurity: No Food Insecurity    Worried About Running Out of Food in the Last Year: Never true    Jose of Food in the Last Year: Never true   Transportation Needs:     Lack of Transportation (Medical): Not on file    Lack of Transportation (Non-Medical): Not on file   Physical Activity:     Days of Exercise per Week: Not on file    Minutes of Exercise per Session: Not on file   Stress:     Feeling of Stress : Not on file   Social Connections:     Frequency of Communication with Friends and Family: Not on file    Frequency of Social Gatherings with Friends and Family: Not on file    Attends [employer] Services: Not on file    Active Member of [address] [place] [address] [employer] or Organizations: Not on file    Attends Club or Organization Meetings: Not on file    Marital Status: Not on file   Intimate Partner Violence:     Fear of Current or Ex-Partner: Not on file    Emotionally Abused: Not on file    Physically Abused: Not on file    Sexually Abused: Not on file   Housing Stability:     Unable to Pay for Housing in the Last Year: Not on file    Number of [place] in the Last Year: Not on file    Unstable Housing in the Last Year: Not on file       Current Outpatient Medications   Medication Sig Dispense Refill    metFORMIN (GLUCOPHAGE) 500 MG tablet Take 2 tablets by mouth 2 times daily (with meals) 360 tablet 1    levothyroxine (SYNTHROID) 175 MCG tablet Take 1 tablet by mouth once daily 90 tablet 1    rosuvastatin (CRESTOR) 40 MG tablet Take 1 tablet by mouth every evening 30 tablet 2    lisinopril (PRINIVIL;ZESTRIL) 10 MG tablet Take 1 tablet by mouth daily 90 tablet 1    aspirin 81 MG chewable tablet One dialy 100 tablet 3    polyethylene glycol (GLYCOLAX) powder Take 17 g by mouth daily      Blood Glucose Monitoring Suppl (ONE TOUCH ULTRA 2) w/Device KIT 1 kit by Does not apply route daily 1 kit 0    Lancets MISC 1 each by Does not apply route daily 100 each 1    blood glucose monitor strips Test every 8 hrs. 100 strip 0     No current facility-administered medications for this visit. Objective:  Pt feels good today saw optometrist she said everything was good. Upon further questioning he has smoked for over 40 years quit about 2 years ago. Review of Systems   Constitutional: Negative for fatigue and unexpected weight change. Eyes: Negative for photophobia and visual disturbance. Respiratory: Negative for cough, chest tightness, shortness of breath and wheezing. Cardiovascular: Negative for chest pain, palpitations and leg swelling. Gastrointestinal: Negative for abdominal pain. Genitourinary: Negative for difficulty urinating and hematuria. Musculoskeletal: Positive for arthralgias (from arthritis shoulders knees ). Negative for myalgias. Skin: Negative for rash and wound. Neurological: Negative for dizziness, tremors, seizures, weakness, light-headedness, numbness and headaches. Hematological: Negative for adenopathy. Does not bruise/bleed easily. Psychiatric/Behavioral: Negative for agitation, confusion and suicidal ideas. Physical Exam  Constitutional:       General: He is not in acute distress. Appearance: Normal appearance. He is obese. He is not ill-appearing or toxic-appearing. HENT:      Head: Normocephalic and atraumatic. Right Ear: Tympanic membrane, ear canal and external ear normal. There is no impacted cerumen. Left Ear: Tympanic membrane, ear canal and external ear normal. There is no impacted cerumen. Nose: Nose normal.      Mouth/Throat:      Mouth: Mucous membranes are moist.      Pharynx: Oropharynx is clear. Eyes:      Extraocular Movements: Extraocular movements intact. Conjunctiva/sclera: Conjunctivae normal.      Pupils: Pupils are equal, round, and reactive to light. Cardiovascular:      Rate and Rhythm: Normal rate and regular rhythm. Pulses: Normal pulses. Heart sounds: Normal heart sounds. No murmur heard.       Pulmonary:      Effort: Pulmonary effort is normal.      Breath sounds: Normal breath sounds. No wheezing, rhonchi or rales. Abdominal:      General: Abdomen is flat. Bowel sounds are normal. There is no distension. Palpations: Abdomen is soft. There is no mass. Tenderness: There is no abdominal tenderness. There is no guarding. Musculoskeletal:         General: Normal range of motion. Cervical back: Normal range of motion and neck supple. Right lower leg: No edema. Left lower leg: No edema. Lymphadenopathy:      Cervical: No cervical adenopathy. Skin:     General: Skin is warm. Capillary Refill: Capillary refill takes less than 2 seconds. Neurological:      General: No focal deficit present. Mental Status: He is alert and oriented to person, place, and time. Gait: Gait normal.   Psychiatric:         Mood and Affect: Mood normal.         Behavior: Behavior normal.         Thought Content: Thought content normal.          Assessment:   Diagnosis Orders   1. Type 2 diabetes mellitus without complication, without long-term current use of insulin ([facility])   DIABETES FOOT EXAM    POCT Hb A1C (glycosylated hemoglobin)    Comprehensive Metabolic Panel   2. Encounter for abdominal aortic aneurysm (AAA) screening  US SCREENING FOR AAA   3. Acquired hypothyroidism  TSH with Reflex   4. Hypertriglyceridemia  Lipid Panel   5. Personal history of tobacco use  MT VISIT TO DISCUSS LUNG CA SCREEN W LDCT    CT Lung Screen (Annual)   6. Essential hypertension  Comprehensive Metabolic Panel    TSH with Reflex         Plan:  Orders Placed This Encounter   Procedures    US SCREENING FOR AAA     Standing Status:   Future     Standing Expiration Date:   6/18/2022     Order Specific Question:   Reason for exam:     Answer:   Encounter for abdominal aortic aneurysm (AAA) screening    CT Lung Screen (Annual)     Age: Patient is 78 y.o.     Smoking History: Social History    Tobacco Use      Smoking status: Former Smoker        Packs/day: 0.50        Years: 40.00        Pack years: 20        Types: Cigarettes        Quit date: 10/5/2020        Years since quittin.5      Smokeless tobacco: Never Used    Alcohol use: Not on file    Drug use: Not on file   Pack years: 20    Date of last lung cancer screening: No previous lung cancer screening exam     Standing Status:   Future     Standing Expiration Date:   10/18/2023     Order Specific Question:   Is there documentation of shared decision making? Answer:   Yes     Order Specific Question:   Is this a low dose CT or a routine CT? Answer:   Low Dose CT [1]     Order Specific Question:   Is this the first (baseline) CT or an annual exam?     Answer:   Baseline [1]     Order Specific Question:   Does the patient show any signs or symptoms of lung cancer? Answer:   No     Order Specific Question:   Smoking Status? Answer: Former Smoker [4]     Order Specific Question:   Date quit smoking? (must be within 15 years)     Answer:   10/5/2020     Order Specific Question:   Smoking packs per day? Answer:   0.5     Order Specific Question:   Years smoking? Answer:   36    Comprehensive Metabolic Panel     Standing Status:   Future     Standing Expiration Date:   10/18/2022    Lipid Panel     Standing Status:   Future     Standing Expiration Date:   10/18/2022     Order Specific Question:   Is Patient Fasting?/# of Hours     Answer: Yes    TSH with Reflex     Standing Status:   Future     Standing Expiration Date:   10/18/2022    POCT Hb A1C (glycosylated hemoglobin)    HM DIABETES FOOT EXAM    SD VISIT TO DISCUSS LUNG CA SCREEN W LDCT         Patient Instructions     What is lung cancer screening? Lung cancer screening is a way in which doctors check the lungs for early signs of cancer in people who have no symptoms of lung cancer. A low-dose CT scan uses much less radiation than a normal CT scan and shows a more detailed image of the lungs than a standard X-ray.   The goal of lung cancer screening is to find cancer early, before it has a chance to grow, spread, or cause problems. One large study found that smokers who were screened with low-dose CT scans were less likely to die of lung cancer than those who were screened with standard X-ray. Below is a summary of the things you need to know regarding screening for lung cancer with low-dose computed tomography (LDCT). This is a screening program that involves routine annual screening with LDCT studies of the lung. The LDCTs are done using low-dose radiation that is not thought to increase your cancer risk. If you have other serious medical conditions (other cancers, congestive heart failure) that limit your life expectancy to less than 10 years, you should not undergo lung cancer screening with LDCT. The chance is 20%-60% that the LDCT result will show abnormalities. This would require additional testing which could include repeat imaging or even invasive procedures. Most (about 95%) of \"abnormal\" LDCT results are false in the sense that no lung cancer is ultimately found. Additionally, some (about 10%) of the cancers found would not affect your life expectancy, even if undetected and untreated. If you are still smoking, the single most important thing that you can do to reduce your risk of dying of lung cancer is to quit. For this screening to be covered by Medicare and most other insurers, strict criteria must be met. If you do not meet these criteria, but still wish to undergo LDCT testing, you will be required to sign a waiver indicating your willingness to pay for the scan. DM:   NO sugar, decrease fruit intake, No juice, NO veggies with color other than green, NO sauteed onions or anything that caramelizes, you may eat raw onions. NO alcohol, try not to eat diabetic candies as they may cause diarrhea. Minimize your carbohydrate intake.     Elevated Cholesterol:   No greasy, fried, fast, fatty foods   No saturated fats   Decreased red meat intake to once every 2 months   No butter, torres nor cream cheese   No egg yolk   NO milk   Decrease your cholesterol in diet    HgA1C today in office 6.7  Ordered a CT scan history tobacco use  Ordered AAA ultrasound  Reviewed blood work from 04/2022  Check your feet every night before bed for wounds    Fasting blood work and follow up in 08/2022    AWV in 2 weeks           Return in about 4 months (around 8/18/2022) for AWV iin 2 weeks, and 4 months. Gavin Jimenes DO   Low Dose CT (LDCT) Lung Screening criteria met:     Age 50-77(Medicare) or 50-80 ([facility])   Pack year smoking >20   Still smoking or less than 15 year since quit   No sign or symptoms of lung cancer   > 11 months since last LDCT     Risks and benefits of lung cancer screening with LDCT scans discussed:    Significance of positive screen - False-positive LDCT results often occur. 95% of all positive results do not lead to a diagnosis of cancer. Usually further imaging can resolve most false-positive results; however, some patients may require invasive procedures. Over diagnosis risk - 10% to 12% of screen-detected lung cancer cases are over diagnosed--that is, the cancer would not have been detected in the patient's lifetime without the screening. Need for follow up screens annually to continue lung cancer screening effectiveness     Risks associated with radiation from annual LDCT- Radiation exposure is about the same as for a mammogram, which is about 1/3 of the annual background radiation exposure from everyday life. Starting screening at age 54 is not likely to increase cancer risk from radiation exposure. Patients with comorbidities resulting in life expectancy of < 10 years, or that would preclude treatment of an abnormality identified on CT, should not be screened due to lack of benefit.     To obtain maximal benefit from this screening, smoking cessation and long-term abstinence from smoking is critical

## 2022-04-18 NOTE — PATIENT INSTRUCTIONS
What is lung cancer screening? Lung cancer screening is a way in which doctors check the lungs for early signs of cancer in people who have no symptoms of lung cancer. A low-dose CT scan uses much less radiation than a normal CT scan and shows a more detailed image of the lungs than a standard X-ray. The goal of lung cancer screening is to find cancer early, before it has a chance to grow, spread, or cause problems. One large study found that smokers who were screened with low-dose CT scans were less likely to die of lung cancer than those who were screened with standard X-ray. Below is a summary of the things you need to know regarding screening for lung cancer with low-dose computed tomography (LDCT). This is a screening program that involves routine annual screening with LDCT studies of the lung. The LDCTs are done using low-dose radiation that is not thought to increase your cancer risk. If you have other serious medical conditions (other cancers, congestive heart failure) that limit your life expectancy to less than 10 years, you should not undergo lung cancer screening with LDCT. The chance is 20%-60% that the LDCT result will show abnormalities. This would require additional testing which could include repeat imaging or even invasive procedures. Most (about 95%) of \"abnormal\" LDCT results are false in the sense that no lung cancer is ultimately found. Additionally, some (about 10%) of the cancers found would not affect your life expectancy, even if undetected and untreated. If you are still smoking, the single most important thing that you can do to reduce your risk of dying of lung cancer is to quit. For this screening to be covered by Medicare and most other insurers, strict criteria must be met. If you do not meet these criteria, but still wish to undergo LDCT testing, you will be required to sign a waiver indicating your willingness to pay for the scan.     DM:   NO sugar, decrease fruit intake, No juice, NO veggies with color other than green, NO sauteed onions or anything that caramelizes, you may eat raw onions. NO alcohol, try not to eat diabetic candies as they may cause diarrhea. Minimize your carbohydrate intake.     Elevated Cholesterol:   No greasy, fried, fast, fatty foods   No saturated fats   Decreased red meat intake to once every 2 months   No butter, torres nor cream cheese   No egg yolk   NO milk   Decrease your cholesterol in diet    HgA1C today in office 6.7  Ordered a CT scan history tobacco use  Ordered AAA ultrasound  Reviewed blood work from 04/2022  Check your feet every night before bed for wounds    Fasting blood work and follow up in 08/2022    AWV in 2 weeks

## 2022-04-30 SDOH — HEALTH STABILITY: PHYSICAL HEALTH: ON AVERAGE, HOW MANY MINUTES DO YOU ENGAGE IN EXERCISE AT THIS LEVEL?: 30 MIN

## 2022-04-30 SDOH — HEALTH STABILITY: PHYSICAL HEALTH: ON AVERAGE, HOW MANY DAYS PER WEEK DO YOU ENGAGE IN MODERATE TO STRENUOUS EXERCISE (LIKE A BRISK WALK)?: 2 DAYS

## 2022-04-30 ASSESSMENT — LIFESTYLE VARIABLES
HOW OFTEN DURING THE LAST YEAR HAVE YOU HAD A FEELING OF GUILT OR REMORSE AFTER DRINKING: 0
HAS A RELATIVE, FRIEND, DOCTOR, OR ANOTHER HEALTH PROFESSIONAL EXPRESSED CONCERN ABOUT YOUR DRINKING OR SUGGESTED YOU CUT DOWN: NO
HOW OFTEN DO YOU HAVE A DRINK CONTAINING ALCOHOL: 5
HOW OFTEN DURING THE LAST YEAR HAVE YOU NEEDED AN ALCOHOLIC DRINK FIRST THING IN THE MORNING TO GET YOURSELF GOING AFTER A NIGHT OF HEAVY DRINKING: NEVER
HAVE YOU OR SOMEONE ELSE BEEN INJURED AS A RESULT OF YOUR DRINKING: NO
HOW OFTEN DURING THE LAST YEAR HAVE YOU FAILED TO DO WHAT WAS NORMALLY EXPECTED FROM YOU BECAUSE OF DRINKING: NEVER
HOW OFTEN DURING THE LAST YEAR HAVE YOU BEEN UNABLE TO REMEMBER WHAT HAPPENED THE NIGHT BEFORE BECAUSE YOU HAD BEEN DRINKING: 0
HOW OFTEN DURING THE LAST YEAR HAVE YOU FAILED TO DO WHAT WAS NORMALLY EXPECTED FROM YOU BECAUSE OF DRINKING: 0
HOW OFTEN DURING THE LAST YEAR HAVE YOU FOUND THAT YOU WERE NOT ABLE TO STOP DRINKING ONCE YOU HAD STARTED: NEVER
HOW OFTEN DO YOU HAVE SIX OR MORE DRINKS ON ONE OCCASION: 1
HOW OFTEN DO YOU HAVE A DRINK CONTAINING ALCOHOL: 4 OR MORE TIMES A WEEK
HOW OFTEN DURING THE LAST YEAR HAVE YOU BEEN UNABLE TO REMEMBER WHAT HAPPENED THE NIGHT BEFORE BECAUSE YOU HAD BEEN DRINKING: NEVER
HOW OFTEN DURING THE LAST YEAR HAVE YOU FOUND THAT YOU WERE NOT ABLE TO STOP DRINKING ONCE YOU HAD STARTED: 0
HOW MANY STANDARD DRINKS CONTAINING ALCOHOL DO YOU HAVE ON A TYPICAL DAY: 1 OR 2
HOW OFTEN DURING THE LAST YEAR HAVE YOU NEEDED AN ALCOHOLIC DRINK FIRST THING IN THE MORNING TO GET YOURSELF GOING AFTER A NIGHT OF HEAVY DRINKING: 0
HOW MANY STANDARD DRINKS CONTAINING ALCOHOL DO YOU HAVE ON A TYPICAL DAY: 1
HAVE YOU OR SOMEONE ELSE BEEN INJURED AS A RESULT OF YOUR DRINKING: 0
HOW OFTEN DURING THE LAST YEAR HAVE YOU HAD A FEELING OF GUILT OR REMORSE AFTER DRINKING: NEVER
HAS A RELATIVE, FRIEND, DOCTOR, OR ANOTHER HEALTH PROFESSIONAL EXPRESSED CONCERN ABOUT YOUR DRINKING OR SUGGESTED YOU CUT DOWN: 0

## 2022-04-30 ASSESSMENT — PATIENT HEALTH QUESTIONNAIRE - PHQ9
SUM OF ALL RESPONSES TO PHQ QUESTIONS 1-9: 0
1. LITTLE INTEREST OR PLEASURE IN DOING THINGS: 0
SUM OF ALL RESPONSES TO PHQ QUESTIONS 1-9: 0
SUM OF ALL RESPONSES TO PHQ QUESTIONS 1-9: 0
SUM OF ALL RESPONSES TO PHQ9 QUESTIONS 1 & 2: 0
2. FEELING DOWN, DEPRESSED OR HOPELESS: 0
SUM OF ALL RESPONSES TO PHQ QUESTIONS 1-9: 0

## 2022-05-12 ENCOUNTER — OFFICE VISIT (OUTPATIENT)
Dept: FAMILY MEDICINE CLINIC | Age: 67
End: 2022-05-12
Payer: MEDICARE

## 2022-05-12 VITALS
RESPIRATION RATE: 16 BRPM | SYSTOLIC BLOOD PRESSURE: 116 MMHG | BODY MASS INDEX: 34.39 KG/M2 | HEART RATE: 69 BPM | HEIGHT: 66 IN | TEMPERATURE: 97.8 F | DIASTOLIC BLOOD PRESSURE: 60 MMHG | WEIGHT: 214 LBS | OXYGEN SATURATION: 96 %

## 2022-05-12 DIAGNOSIS — E11.9 TYPE 2 DIABETES MELLITUS WITHOUT COMPLICATION, WITHOUT LONG-TERM CURRENT USE OF INSULIN (HCC): ICD-10-CM

## 2022-05-12 DIAGNOSIS — Z00.00 INITIAL MEDICARE ANNUAL WELLNESS VISIT: Primary | ICD-10-CM

## 2022-05-12 PROCEDURE — 3044F HG A1C LEVEL LT 7.0%: CPT | Performed by: FAMILY MEDICINE

## 2022-05-12 PROCEDURE — G0438 PPPS, INITIAL VISIT: HCPCS | Performed by: FAMILY MEDICINE

## 2022-05-12 PROCEDURE — 99397 PER PM REEVAL EST PAT 65+ YR: CPT | Performed by: FAMILY MEDICINE

## 2022-05-12 PROCEDURE — G0463 HOSPITAL OUTPT CLINIC VISIT: HCPCS | Performed by: FAMILY MEDICINE

## 2022-05-12 NOTE — PATIENT INSTRUCTIONS
Learning About Healthy Weight  What is a healthy weight? A healthy weight is the weight at which you feel good about yourself and have energy for work and play. It's also one that lowers your risk for healthproblems. What can you do to stay at a healthy weight? It can be hard to stay at a healthy weight, especially when fast food, vending-machine snacks, and processed foods are so easy to find. And with your busy lifestyle, activity may be low on your list of things to do. But stayingat a healthy weight may be easier than you think. Here are some dos and don'ts for staying at a healthy weight. Do eat healthy foods  The kinds of foods you eat have a big impact on both your weight and your health. Reaching and staying at a healthy weight is not about going on a diet. It's about making healthier food choices every day and changing your diet forgood. Healthy eating means eating a variety of foods so that you get all the nutrients you need. Your body needs protein, carbohydrate, and fats for energy. They keep your heart beating, your brain active, and your muscles working. On most days, try to eat from each food group. This means eating a variety of:   Whole grains, such as whole wheat breads and pastas.  Fruits and vegetables.  Dairy products, such as low-fat milk, yogurt, and cheese.  Lean proteins, such as all types of fish, chicken without the skin, and beans. Don't have too much or too little of one thing. All foods, if eaten inmoderation, can be part of healthy eating. Even sweets can be okay. If your favorite foods are high in fat, salt, sugar, or calories, limit howoften you eat them. Eat smaller servings, or look for healthy substitutes. Do watch what you eat  Many people eat more than their bodies need. Part of staying at a healthy weight means learning how much food you really need from day to day and not eating more than that.  Even with healthy foods, eating too much can make yougain weight. Having a well-balanced diet means that you eat enough, but not too much, and that your food gives you the nutrients you need to stay healthy. So listen toyour body. Eat when you're hungry. Stop when you feel satisfied. It's a good idea to have healthy snacks ready for when you get hungry. Keep healthy snacks with you at work, in your car, and at home. If you have a healthy snack easily available, you'll be less likely to pick a candy bar orbag of chips from a vending machine instead. Some healthy snacks you might want to keep on hand are fruit, low-fat yogurt, string cheese, low-fat microwave popcorn, raisins and other dried fruit, nuts,whole wheat crackers, pretzels, carrots, celery sticks, and broccoli. Do some physical activity  A big part of reaching and staying at a healthy weight is being active. When you're active, you burn calories. This makes it easier to reach and stay at a healthy weight. When you're active on a regular basis, your body burns more calories, even when you're at rest. Being active helps you lose fat andbuild lean muscle. Try to be active for at least 1 hour every day. This may sound like a lot, but it's okay to be active in smaller blocks of time that add up to 1 hour a day. Any activity that makes your heart beat faster and keeps it there for a while counts. A brisk walk, run, or swim will get your heart beating faster. So will climbing stairs, shooting baskets, or cycling. Even some household chores likevacuuming and mowing the lawn will get your heart rate up. Pick activities that you enjoy--ones that make your heart beat faster, your muscles stronger, and your muscles and joints more flexible. If you find more than one thing you like doing, do them all. You don't have to do the same thingevery day. Don't diet  Diets don't work. Diets are temporary. Because you give up so much when you diet, you may be hungry and think about food all the time.  And after you stop dieting, you also may overeat to make up for what you missed. Most people who diet end up gainingback the pounds they lost--and more. Remember that healthy bodies come in lots of shapes and sizes. Everyone can gethealthier by eating better and being more active. Where can you learn more? Go to https://chpepiceweb.[email or web address] org and sign in to your mii account. Enter [id number] in the PodPoster box to learn more about \"Learning About Healthy Weight. \"     If you do not have an account, please click on the \"Sign Up Now\" link. Current as of: December 27, 2021               Content Version: 13.2  © 8753-4859 Healthwise, [employer]. Care instructions adapted under license by [facility] ([facility]). If you have questions about a medical condition or this instruction, always ask your healthcare professional. [place] [address] any warranty or liability for your use of this information. Personalized Preventive Plan for Robb Solano - 5/12/2022  Medicare offers a range of preventive health benefits. Some of the tests and screenings are paid in full while other may be subject to a deductible, co-insurance, and/or copay. Some of these benefits include a comprehensive review of your medical history including lifestyle, illnesses that may run in your family, and various assessments and screenings as appropriate. After reviewing your medical record and screening and assessments performed today your provider may have ordered immunizations, labs, imaging, and/or referrals for you. A list of these orders (if applicable) as well as your Preventive Care list are included within your After Visit Summary for your review. Other Preventive Recommendations:    · A preventive eye exam performed by an eye specialist is recommended every 1-2 years to screen for glaucoma; cataracts, macular degeneration, and other eye disorders. · A preventive dental visit is recommended every 6 months.   · Try to get at least 150 minutes of exercise per week or 10,000 steps per day on a pedometer . · Order or download the FREE \"Exercise & Physical Activity: Your Everyday Guide\" from [employer]. Call 9-311.861.6370 or search [employer] online. · You need 8873-5761 mg of calcium and 0864-9936 IU of vitamin D per day. It is possible to meet your calcium requirement with diet alone, but a vitamin D supplement is usually necessary to meet this goal.  · When exposed to the sun, use a sunscreen that protects against both UVA and UVB radiation with an SPF of 30 or greater. Reapply every 2 to 3 hours or after sweating, drying off with a towel, or swimming. · Always wear a seat belt when traveling in a car. Always wear a helmet when riding a bicycle or motorcycle. DM:   NO sugar, decrease fruit intake, No juice, NO veggies with color other than green, NO sauteed onions or anything that caramelizes, you may eat raw onions. NO alcohol, try not to eat diabetic candies as they may cause diarrhea. Minimize your carbohydrate intake.     Elevated Cholesterol:   No greasy, fried, fast, fatty foods   No trans fats   Decreased red meat intake to once every 2 months   No butter, torres nor cream cheese, cheese   No egg yolk   NO milk   Decrease your cholesterol in diet    Elevated Blood Pressure:   No caffeine   No fast foods   Decrease salt in diet (consume nothing in a can, nothing in a box as these things contain high sodium)   No energy drinks   Buy a BP cuff and take blood pressure 3 times a day and write down blood pressure and pulse and bring in to me when you RTO   Lose weight and increase exercise   Start only walking then may advance to brisk walking and lift low poundage free weights    Pt does not want hearing aids his preference  Pt does not want an MRI even though he got a 24/30 of his mini mental exam    Fasting blood work and follow up as scheduled

## 2022-05-12 NOTE — PROGRESS NOTES
Medicare Annual Wellness Visit    Eulalia Monroy is here for Medicare AWV ((Hypertension, Hypothyroid- Last TSH was 1/5/2022= 2.56))    Assessment & Plan   Initial Medicare annual wellness visit  Type 2 diabetes mellitus without complication, without long-term current use of insulin ([facility] [address]      Recommendations for Preventive Services Due: see orders and patient instructions/AVS.  Recommended screening schedule for the next 5-10 years is provided to the patient in written form: see Patient Instructions/AVS.     Return today (on 5/12/2022), or as scheduled, for Medicare Annual Wellness Visit in 1 year. Subjective   The following acute and/or chronic problems were also addressed today:  Mini mental exam 24/30    Patient's complete Health Risk Assessment and screening values have been reviewed and are found in Flowsheets. The following problems were reviewed today and where indicated follow up appointments were made and/or referrals ordered.     Positive Risk Factor Screenings with Interventions:               General Health and ACP:  General  In general, how would you say your health is?: Good  In the past 7 days, have you experienced any of the following: New or Increased Pain, New or Increased Fatigue, Loneliness, Social Isolation, Stress or Anger?: No  Do you get the social and emotional support that you need?: Yes  Do you have a Living Will?: Yes    Advance Directives     Power of  Living Will ACP-Advance Directive ACP-Power of     Not on File Not on File Not on File Not on File      General Health Risk Interventions:  · Pt has a living will, I gave him information of healthy eating    Health Habits/Nutrition:     Physical Activity: Insufficiently Active    Days of Exercise per Week: 2 days    Minutes of Exercise per Session: 30 min     Have you lost any weight without trying in the past 3 months?: No  Body mass index: (!) 34.43  Have you seen the dentist within the past year?: Yes    Health Habits/Nutrition Interventions:  · Has a dentist has regular appt's    Hearing/Vision:  Do you or your family notice any trouble with your hearing that hasn't been managed with hearing aids?: (!) Yes  Do you have difficulty driving, watching TV, or doing any of your daily activities because of your eyesight?: No  Have you had an eye exam within the past year?: Yes  No exam data present    Hearing/Vision Interventions:  · Pt does not wear hearing aids because he doesnt want to do that            Objective   Vitals:    05/12/22 0924   BP: 116/60   Site: Right Upper Arm   Position: Sitting   Cuff Size: Large Adult   Pulse: 69   Resp: 16   Temp: 97.8 °F (36.6 °C)   SpO2: 96%   Weight: 214 lb (97.1 kg)   Height: 5' 6.1\" (1.679 m)      Body mass index is 34.44 kg/m². Allergies   Allergen Reactions    Morphine      Burning,tingling       Prior to Visit Medications    Medication Sig Taking? Authorizing Provider   metFORMIN (GLUCOPHAGE) 500 MG tablet Take 2 tablets by mouth 2 times daily (with meals) Yes Seda Ibrahim DO   levothyroxine (SYNTHROID) 175 MCG tablet Take 1 tablet by mouth once daily Yes Seda Ibrahim DO   Blood Glucose Monitoring Suppl (ONE TOUCH ULTRA 2) w/Device KIT 1 kit by Does not apply route daily Yes Leopold Gemma, DO   Lancets MISC 1 each by Does not apply route daily Yes Seda Ibrahim DO   blood glucose monitor strips Test every 8 hrs.  Yes Seda Ibrahim DO   rosuvastatin (CRESTOR) 40 MG tablet Take 1 tablet by mouth every evening Yes Seda Ibrahim DO   lisinopril (PRINIVIL;ZESTRIL) 10 MG tablet Take 1 tablet by mouth daily Yes Chepe Potter MD   aspirin 81 MG chewable tablet One dialy Yes Duran Hurst MD   polyethylene glycol (GLYCOLAX) powder Take 17 g by mouth daily Yes Historical Provider, MD Moreno (Including outside providers/suppliers regularly involved in providing care):   Patient Care Team:  Leopold Gemma, DO as PCP - General (Family Medicine)  Charisse Fillers Gianluca Garvey DO as PCP - [facility] Empaneled Provider    Reviewed and updated this visit:  Allergies  Meds                  Obesity Counseling: Assessed behavioral health risks and factors affecting choice of behavior. Suggested weight control approaches, including dietary changes behavioral modification and follow up plan. Provided educational and support documentation.   Time spent (minutes): 20 minutes

## 2022-05-24 DIAGNOSIS — E11.9 TYPE 2 DIABETES MELLITUS WITHOUT COMPLICATION, WITHOUT LONG-TERM CURRENT USE OF INSULIN (HCC): ICD-10-CM

## 2022-05-24 RX ORDER — ROSUVASTATIN CALCIUM 40 MG/1
40 TABLET, COATED ORAL EVERY EVENING
Qty: 90 TABLET | Refills: 1 | Status: SHIPPED | OUTPATIENT
Start: 2022-05-24 | End: 2022-09-06 | Stop reason: SDUPTHER

## 2022-05-24 RX ORDER — GLUCOSAMINE HCL/CHONDROITIN SU 500-400 MG
CAPSULE ORAL
Qty: 100 STRIP | Refills: 0 | OUTPATIENT
Start: 2022-05-24

## 2022-05-24 RX ORDER — ROSUVASTATIN CALCIUM 40 MG/1
40 TABLET, COATED ORAL EVERY EVENING
Qty: 30 TABLET | Refills: 2 | OUTPATIENT
Start: 2022-05-24

## 2022-05-24 RX ORDER — LANCETS 30 GAUGE
1 EACH MISCELLANEOUS DAILY
Qty: 100 EACH | Refills: 1 | Status: SHIPPED | OUTPATIENT
Start: 2022-05-24

## 2022-05-24 RX ORDER — GLUCOSAMINE HCL/CHONDROITIN SU 500-400 MG
CAPSULE ORAL
Qty: 100 STRIP | Refills: 1 | Status: SHIPPED | OUTPATIENT
Start: 2022-05-24 | End: 2022-08-22

## 2022-05-24 RX ORDER — LANCETS 30 GAUGE
1 EACH MISCELLANEOUS DAILY
Qty: 100 EACH | Refills: 1 | OUTPATIENT
Start: 2022-05-24

## 2022-05-24 NOTE — TELEPHONE ENCOUNTER
Josue Burris is requesting a refill on the following medication(s):  Requested Prescriptions     Pending Prescriptions Disp Refills    rosuvastatin (CRESTOR) 40 MG tablet [Pharmacy Med Name: Rosuvastatin Calcium 40 MG Oral Tablet] 30 tablet 5     Sig: TAKE 1 TABLET BY MOUTH ONCE DAILY IN THE EVENING    Lancets MISC 100 each 1     Si each by Does not apply route daily    blood glucose monitor strips 100 strip 1     Sig: Test every 8 hrs.        Last Visit Date (If Applicable):  3656    Next Visit Date:    Visit date not found

## 2022-06-07 DIAGNOSIS — I10 ESSENTIAL HYPERTENSION: ICD-10-CM

## 2022-06-07 RX ORDER — LISINOPRIL 10 MG/1
10 TABLET ORAL DAILY
Qty: 90 TABLET | Refills: 1 | Status: SHIPPED | OUTPATIENT
Start: 2022-06-07

## 2022-06-07 NOTE — TELEPHONE ENCOUNTER
Maritza Diaz is requesting a refill on the following medication(s):  Requested Prescriptions     Pending Prescriptions Disp Refills    lisinopril (PRINIVIL;ZESTRIL) 10 MG tablet 90 tablet 1     Sig: Take 1 tablet by mouth daily       Last Visit Date (If Applicable):  2/37/1390    Next Visit Date:    9/1/2022

## 2022-08-17 LAB
ALBUMIN/GLOBULIN RATIO: 1.7 G/DL
ALBUMIN: 3.9 G/DL (ref 3.5–5)
ALP BLD-CCNC: 46 UNITS/L (ref 38–126)
ALT SERPL-CCNC: 19 UNITS/L (ref 4–50)
ANION GAP SERPL CALCULATED.3IONS-SCNC: 2 MMOL/L
AST SERPL-CCNC: 17 UNITS/L (ref 17–59)
BILIRUB SERPL-MCNC: 0.5 MG/DL (ref 0.2–1.3)
BUN BLDV-MCNC: 20 MG/DL (ref 9–20)
CALCIUM SERPL-MCNC: 9.8 MG/DL (ref 8.4–10.2)
CHLORIDE BLD-SCNC: 106 MMOL/L (ref 98–120)
CHOLESTEROL/HDL RATIO: 2.07 RATIO (ref 0–4.5)
CHOLESTEROL: 116 MG/DL (ref 50–200)
CO2: 30 MMOL/L (ref 22–31)
CREAT SERPL-MCNC: 1.1 MG/DL (ref 0.7–1.3)
GFR CALCULATED: > 60
GLOBULIN: 2.3 G/DL
GLUCOSE: 131 MG/DL (ref 75–110)
HDLC SERPL-MCNC: 56 MG/DL (ref 36–68)
LDL CHOLESTEROL CALCULATED: 39.2 MG/DL (ref 0–160)
POTASSIUM SERPL-SCNC: 4.4 MMOL/L (ref 3.6–5)
SODIUM BLD-SCNC: 138 MMOL/L (ref 135–145)
T4 FREE: 1.83 NG/DL (ref 0.78–2.19)
TOTAL PROTEIN, SERUM: 6.2 G/DL (ref 6.3–8.2)
TRIGL SERPL-MCNC: 104 MG/DL (ref 10–250)
TSH REFLEX FT4: 0.47 MIU/ML (ref 0.49–4.67)
VLDLC SERPL CALC-MCNC: 21 MG/DL (ref 0–50)

## 2022-08-22 ENCOUNTER — OFFICE VISIT (OUTPATIENT)
Dept: FAMILY MEDICINE CLINIC | Age: 67
End: 2022-08-22
Payer: MEDICARE

## 2022-08-22 VITALS
OXYGEN SATURATION: 98 % | DIASTOLIC BLOOD PRESSURE: 64 MMHG | BODY MASS INDEX: 32.43 KG/M2 | HEIGHT: 66 IN | TEMPERATURE: 97.3 F | WEIGHT: 201.8 LBS | HEART RATE: 68 BPM | SYSTOLIC BLOOD PRESSURE: 128 MMHG | RESPIRATION RATE: 16 BRPM

## 2022-08-22 DIAGNOSIS — E78.1 HYPERTRIGLYCERIDEMIA: ICD-10-CM

## 2022-08-22 DIAGNOSIS — H61.21 RIGHT EAR IMPACTED CERUMEN: ICD-10-CM

## 2022-08-22 DIAGNOSIS — I10 ESSENTIAL HYPERTENSION: ICD-10-CM

## 2022-08-22 DIAGNOSIS — E11.9 TYPE 2 DIABETES MELLITUS WITHOUT COMPLICATION, WITHOUT LONG-TERM CURRENT USE OF INSULIN (HCC): Primary | ICD-10-CM

## 2022-08-22 DIAGNOSIS — E78.5 HYPERLIPIDEMIA, UNSPECIFIED HYPERLIPIDEMIA TYPE: ICD-10-CM

## 2022-08-22 DIAGNOSIS — E78.6 HDL DEFICIENCY: ICD-10-CM

## 2022-08-22 LAB — HBA1C MFR BLD: 6.4 %

## 2022-08-22 PROCEDURE — 1123F ACP DISCUSS/DSCN MKR DOCD: CPT | Performed by: FAMILY MEDICINE

## 2022-08-22 PROCEDURE — 83036 HEMOGLOBIN GLYCOSYLATED A1C: CPT | Performed by: FAMILY MEDICINE

## 2022-08-22 PROCEDURE — 99214 OFFICE O/P EST MOD 30 MIN: CPT | Performed by: FAMILY MEDICINE

## 2022-08-22 PROCEDURE — PBSHW POCT GLYCOSYLATED HEMOGLOBIN (HGB A1C): Performed by: FAMILY MEDICINE

## 2022-08-22 PROCEDURE — 69209 REMOVE IMPACTED EAR WAX UNI: CPT | Performed by: FAMILY MEDICINE

## 2022-08-22 PROCEDURE — PBSHW PR REMOVAL IMPACTED CERUMEN IRRIGATION/LVG UNILAT: Performed by: FAMILY MEDICINE

## 2022-08-22 PROCEDURE — 3044F HG A1C LEVEL LT 7.0%: CPT | Performed by: FAMILY MEDICINE

## 2022-08-22 PROCEDURE — 99213 OFFICE O/P EST LOW 20 MIN: CPT | Performed by: FAMILY MEDICINE

## 2022-08-22 RX ORDER — PREDNISONE 1 MG/1
TABLET ORAL
COMMUNITY
Start: 2022-07-28

## 2022-08-22 SDOH — ECONOMIC STABILITY: FOOD INSECURITY: WITHIN THE PAST 12 MONTHS, THE FOOD YOU BOUGHT JUST DIDN'T LAST AND YOU DIDN'T HAVE MONEY TO GET MORE.: NEVER TRUE

## 2022-08-22 SDOH — ECONOMIC STABILITY: FOOD INSECURITY: WITHIN THE PAST 12 MONTHS, YOU WORRIED THAT YOUR FOOD WOULD RUN OUT BEFORE YOU GOT MONEY TO BUY MORE.: NEVER TRUE

## 2022-08-22 ASSESSMENT — ENCOUNTER SYMPTOMS
CHEST TIGHTNESS: 0
DIARRHEA: 0
CONSTIPATION: 0
WHEEZING: 0
COUGH: 0
PHOTOPHOBIA: 0
VOMITING: 0
ABDOMINAL PAIN: 0
CHOKING: 0
NAUSEA: 0
SHORTNESS OF BREATH: 0

## 2022-08-22 ASSESSMENT — PATIENT HEALTH QUESTIONNAIRE - PHQ9
SUM OF ALL RESPONSES TO PHQ9 QUESTIONS 1 & 2: 0
1. LITTLE INTEREST OR PLEASURE IN DOING THINGS: 0
2. FEELING DOWN, DEPRESSED OR HOPELESS: 0
SUM OF ALL RESPONSES TO PHQ QUESTIONS 1-9: 0

## 2022-08-22 ASSESSMENT — SOCIAL DETERMINANTS OF HEALTH (SDOH): HOW HARD IS IT FOR YOU TO PAY FOR THE VERY BASICS LIKE FOOD, HOUSING, MEDICAL CARE, AND HEATING?: NOT HARD AT ALL

## 2022-08-22 NOTE — PROGRESS NOTES
Name: Priyanka Rey  DOS: 2022  MRN: [id number]      Subjective:  Priyanka Rey is a 78 y.o. male being seen for   Chief Complaint   Patient presents with    Hypertension     Lab results.      Hypothyroidism     Last TSH and T4 was 2022:  TSH= 0.47, T4= 1.83,     Diabetes     Last HGB A1C was 2022= 6.7       Vitals:    22 0809   BP: 128/64   Pulse: 68   Resp: 16   Temp: 97.3 °F (36.3 °C)   SpO2: 98%     Allergies   Allergen Reactions    Morphine      Burning,tingling       Past Medical History:   Diagnosis Date    DM2 (diabetes mellitus, type 2) (HCC)     Hyperlipidemia     Hypothyroidism     Polymyalgia rheumatica (HCC)     Shingles      Past Surgical History:   Procedure Laterality Date    BACK SURGERY      x4    CARPAL TUNNEL RELEASE Bilateral     CATARACT EXTRACTION W/  INTRAOCULAR LENS IMPLANT Bilateral 10/2017    COLONOSCOPY      EYE SURGERY  2017    Cataract    EYE SURGERY  10/2017    Cataract    HERNIA REPAIR      baby    LAMINECTOMY  2016    decompression    TONSILLECTOMY      TUMOR REMOVAL      lt arm     Social History     Socioeconomic History    Marital status:    Tobacco Use    Smoking status: Former     Packs/day: 0.50     Years: 40.00     Pack years: 20.00     Types: Cigarettes     Quit date: 10/5/2020     Years since quittin.8    Smokeless tobacco: Never     Social Determinants of Health     Financial Resource Strain: Low Risk     Difficulty of Paying Living Expenses: Not hard at all   Food Insecurity: No Food Insecurity    Worried About Running Out of Food in the Last Year: Never true    Ran Out of Food in the Last Year: Never true   Physical Activity: Insufficiently Active    Days of Exercise per Week: 2 days    Minutes of Exercise per Session: 30 min       Current Outpatient Medications   Medication Sig Dispense Refill    predniSONE (DELTASONE) 5 MG tablet       lisinopril (PRINIVIL;ZESTRIL) 10 MG tablet Take 1 tablet by mouth daily 90 tablet 1 rosuvastatin (CRESTOR) 40 MG tablet Take 1 tablet by mouth every evening 90 tablet 1    Lancets MISC 1 each by Does not apply route daily 100 each 1    metFORMIN (GLUCOPHAGE) 500 MG tablet Take 2 tablets by mouth 2 times daily (with meals) 360 tablet 1    levothyroxine (SYNTHROID) 175 MCG tablet Take 1 tablet by mouth once daily 90 tablet 1    Blood Glucose Monitoring Suppl (ONE TOUCH ULTRA 2) w/Device KIT 1 kit by Does not apply route daily 1 kit 0    aspirin 81 MG chewable tablet One dialy 100 tablet 3    polyethylene glycol (GLYCOLAX) powder Take 17 g by mouth daily       No current facility-administered medications for this visit. Objective:  Pt is here for follow up on his diabetes, cholesterol and BP. Pt saw his rheumatologist started him on prednisone for his polymyalgia rheumatica. He Is taking 5 mg a day he will stop it on his own some day this weekend he will stop and sees what happens, he was given 4 months worth. Review of Systems   Constitutional:  Positive for fatigue (sometimes). Negative for unexpected weight change. Eyes:  Negative for photophobia and visual disturbance. Respiratory:  Negative for cough, choking, chest tightness, shortness of breath and wheezing. Cardiovascular:  Positive for leg swelling (comes and goes chronic). Negative for chest pain and palpitations. Gastrointestinal:  Negative for abdominal pain, constipation, diarrhea, nausea and vomiting. Genitourinary:  Negative for difficulty urinating and hematuria. Musculoskeletal:  Positive for arthralgias (everywhere when his polymyalgia rheumatica acts up) and myalgias. Skin:  Negative for rash and wound. Neurological:  Negative for dizziness, weakness, light-headedness, numbness and headaches. Hematological:  Negative for adenopathy. Does not bruise/bleed easily. Psychiatric/Behavioral:  Negative for agitation, confusion, decreased concentration and suicidal ideas.     Physical Exam  Constitutional: General: He is not in acute distress. Appearance: Normal appearance. He is not ill-appearing, toxic-appearing or diaphoretic. HENT:      Head: Normocephalic and atraumatic. Right Ear: Tympanic membrane, ear canal and external ear normal. There is impacted cerumen (s/p irrigation cerumen is gone, no erythema, no bleeding pt tolerated well). Left Ear: Tympanic membrane, ear canal and external ear normal. There is no impacted cerumen. Nose: Nose normal. No congestion or rhinorrhea. Mouth/Throat:      Mouth: Mucous membranes are moist.      Pharynx: Oropharynx is clear. No oropharyngeal exudate or posterior oropharyngeal erythema. Eyes:      Extraocular Movements: Extraocular movements intact. Conjunctiva/sclera: Conjunctivae normal.      Pupils: Pupils are equal, round, and reactive to light. Cardiovascular:      Rate and Rhythm: Normal rate and regular rhythm. Pulses: Normal pulses. Heart sounds: Normal heart sounds. No murmur heard. Pulmonary:      Effort: Pulmonary effort is normal.      Breath sounds: Normal breath sounds. No wheezing, rhonchi or rales. Abdominal:      General: Abdomen is flat. Bowel sounds are normal. There is no distension. Palpations: Abdomen is soft. There is no mass. Tenderness: There is no abdominal tenderness. There is no right CVA tenderness, left CVA tenderness or guarding. Musculoskeletal:         General: Normal range of motion. Cervical back: Normal range of motion and neck supple. Right lower leg: No edema. Left lower leg: No edema. Lymphadenopathy:      Cervical: No cervical adenopathy. Skin:     General: Skin is warm. Capillary Refill: Capillary refill takes less than 2 seconds. Findings: No rash. Neurological:      General: No focal deficit present. Mental Status: He is alert and oriented to person, place, and time. Motor: No weakness.       Coordination: Coordination normal. Gait: Gait normal.   Psychiatric:         Mood and Affect: Mood normal.         Behavior: Behavior normal.         Thought Content: Thought content normal.        Assessment:   Diagnosis Orders   1. Type 2 diabetes mellitus without complication, without long-term current use of insulin ([facility])  POCT Hb A1C (glycosylated hemoglobin)    Comprehensive Metabolic Panel      2. Essential hypertension  Comprehensive Metabolic Panel      3. Hypertriglyceridemia  Comprehensive Metabolic Panel    Lipid Panel      4. HDL deficiency  Comprehensive Metabolic Panel    Lipid Panel      5. Hyperlipidemia, unspecified hyperlipidemia type  Comprehensive Metabolic Panel    Lipid Panel      6. Right ear impacted cerumen  OR REMOVAL IMPACTED CERUMEN IRRIGATION/LVG UNILAT            Plan:  Orders Placed This Encounter   Procedures    Comprehensive Metabolic Panel     Standing Status:   Future     Standing Expiration Date:   1/5/2023    Lipid Panel     Standing Status:   Future     Standing Expiration Date:   1/5/2023     Order Specific Question:   Is Patient Fasting?/# of Hours     Answer:   Yes    POCT Hb A1C (glycosylated hemoglobin)    OR REMOVAL IMPACTED CERUMEN IRRIGATION/LVG UNILAT         Patient Instructions   Nutrition Health Education:    Keep hydrated, walk 30 minutes minimum 3 times weekly as tolerated. Diet should consist of low fat, low sodium and high fiber. Nutritious foods such as fruits (if you're not diabetic), vegetables, lean meats, lean dairy, whole grains such as brown rice, quinoa, and dry beans. Marden Old with small amounts of heart healthy extra virgin olive oil. Be watchful of any extra salt/sugar/seasoning to your food. You should eat no more than 2 grams or 2,000 mg of salt daily. Salt will raise your BP. Avoid regular/diet sodas, caffeine, energy drinks as these are full of artificial ingredients/sweeteners, sugar, salt and chemicals that spike insulin and are harmful to your health.  Sugar intake increases metabolic disfunction, type 2 diabetes, insulin resistance, addictive food behavior and obesity. Avoid all processed foods, foods from boxes, cans, microwave meals as these contain high salt, sugar or fat content and not much nutrition. Get at least 8 hrs of sleep every night and turn off all electronics at least 1 hour before bedtime as these decreases melatonin production and increases wakefulness. If your cholesterol is high, no greasy, fried, fast or fatty foods. Decrease red meat intake. No butter, torres, lard or creams, no milk as these things clog your arteries and leads to heart attacks and death. If you smoke, smoking increases risk of lung disease, cancers, high BP, heart attack, stroke and death. Take your daily medications as prescribed and inform your family doctor if you are having any side effects or issues taking medications. DM:   NO sugar, decrease fruit intake, No juice, NO veggies with color other than green, NO sauteed onions or anything that caramelizes, you may eat raw onions. NO alcohol, try not to eat diabetic candies as they may cause diarrhea. Minimize your carbohydrate intake.      Elevated Blood Pressure:  No caffeine  No fast foods  Decrease salt in diet (consume nothing in a can, nothing in a box as these things contain high sodium)  No energy drinks  Buy a BP cuff and take blood pressure 3 times a day and write down blood pressure and pulse and bring in to me when you RTO  Lose weight and increase exercise if you are capable of exercising  Start only walking then may advance to brisk walking and lift low poundage free weights if you are capable     Elevated Cholesterol:  No greasy, fried, fast, fatty foods  No trans fats  Decreased red meat intake to once every 2 months  No butter, torres nor cream cheese, cheese  No egg yolk  NO milk  Decrease your cholesterol in diet    Rev labs with pt cmp, lipids,tsh,t4    HgA1C 6.2 today reviewed with pt     S/P ear irrigation: If you develop ear pain or discharge call me as you may have and ear infection and I may call in medicated ear drops    Fasting blood work and follow up in 4 months     Return in about 4 months (around 12/22/2022) for REVIEW LABS.      Cesar Reid, DO

## 2022-08-22 NOTE — PATIENT INSTRUCTIONS
Nutrition Health Education:    Keep hydrated, walk 30 minutes minimum 3 times weekly as tolerated. Diet should consist of low fat, low sodium and high fiber. Nutritious foods such as fruits (if you're not diabetic), vegetables, lean meats, lean dairy, whole grains such as brown rice, quinoa, and dry beans. Roxanna Lasso with small amounts of heart healthy extra virgin olive oil. Be watchful of any extra salt/sugar/seasoning to your food. You should eat no more than 2 grams or 2,000 mg of salt daily. Salt will raise your BP. Avoid regular/diet sodas, caffeine, energy drinks as these are full of artificial ingredients/sweeteners, sugar, salt and chemicals that spike insulin and are harmful to your health. Sugar intake increases metabolic disfunction, type 2 diabetes, insulin resistance, addictive food behavior and obesity. Avoid all processed foods, foods from boxes, cans, microwave meals as these contain high salt, sugar or fat content and not much nutrition. Get at least 8 hrs of sleep every night and turn off all electronics at least 1 hour before bedtime as these decreases melatonin production and increases wakefulness. If your cholesterol is high, no greasy, fried, fast or fatty foods. Decrease red meat intake. No butter, torres, lard or creams, no milk as these things clog your arteries and leads to heart attacks and death. If you smoke, smoking increases risk of lung disease, cancers, high BP, heart attack, stroke and death. Take your daily medications as prescribed and inform your family doctor if you are having any side effects or issues taking medications. DM:   NO sugar, decrease fruit intake, No juice, NO veggies with color other than green, NO sauteed onions or anything that caramelizes, you may eat raw onions. NO alcohol, try not to eat diabetic candies as they may cause diarrhea. Minimize your carbohydrate intake.      Elevated Blood Pressure:  No caffeine  No fast foods  Decrease salt in diet (consume nothing

## 2022-09-02 DIAGNOSIS — E03.9 ACQUIRED HYPOTHYROIDISM: ICD-10-CM

## 2022-09-02 RX ORDER — LEVOTHYROXINE SODIUM 175 UG/1
TABLET ORAL
Qty: 90 TABLET | Refills: 1 | Status: SHIPPED | OUTPATIENT
Start: 2022-09-02 | End: 2022-09-21

## 2022-09-02 NOTE — TELEPHONE ENCOUNTER
Lissette Parish is requesting a refill on the following medication(s):  Requested Prescriptions     Pending Prescriptions Disp Refills    levothyroxine (EUTHYROX) 175 MCG tablet [Pharmacy Med Name: Euthyrox 175 MCG Oral Tablet] 90 tablet 1     Sig: Take 1 tablet by mouth once daily       Last Visit Date (If Applicable):  9/23/2653    Next Visit Date:    12/22/2022

## 2022-09-06 RX ORDER — ROSUVASTATIN CALCIUM 40 MG/1
40 TABLET, COATED ORAL EVERY EVENING
Qty: 90 TABLET | Refills: 1 | Status: SHIPPED | OUTPATIENT
Start: 2022-09-06 | End: 2022-12-05

## 2022-09-06 NOTE — TELEPHONE ENCOUNTER
Selene Vee is requesting a refill on the following medication(s):  Requested Prescriptions     Pending Prescriptions Disp Refills    rosuvastatin (CRESTOR) 40 MG tablet 90 tablet 1     Sig: Take 1 tablet by mouth every evening       Last Visit Date (If Applicable):  4/32/7992    Next Visit Date:    12/22/2022

## 2022-09-08 DIAGNOSIS — E11.9 TYPE 2 DIABETES MELLITUS WITHOUT COMPLICATION, WITHOUT LONG-TERM CURRENT USE OF INSULIN (HCC): ICD-10-CM

## 2022-09-08 NOTE — TELEPHONE ENCOUNTER
Marbin Terrazas is requesting a refill on the following medication(s):  Requested Prescriptions     Pending Prescriptions Disp Refills    metFORMIN (GLUCOPHAGE) 500 MG tablet 360 tablet 1     Sig: Take 2 tablets by mouth 2 times daily (with meals)       Last Visit Date (If Applicable):  4/39/7437    Next Visit Date:    12/22/2022

## 2022-09-20 DIAGNOSIS — E03.9 ACQUIRED HYPOTHYROIDISM: ICD-10-CM

## 2022-09-21 RX ORDER — LEVOTHYROXINE SODIUM 175 UG/1
TABLET ORAL
Qty: 90 TABLET | Refills: 0 | Status: SHIPPED | OUTPATIENT
Start: 2022-09-21

## 2022-09-21 NOTE — TELEPHONE ENCOUNTER
Requested Prescriptions     Pending Prescriptions Disp Refills    levothyroxine (SYNTHROID) 175 MCG tablet [Pharmacy Med Name: Levothyroxine Sodium 175 MCG Oral Tablet] 90 tablet 0     Sig: Take 1 tablet by mouth once daily

## 2022-11-09 DIAGNOSIS — E03.9 ACQUIRED HYPOTHYROIDISM: ICD-10-CM

## 2022-11-09 DIAGNOSIS — E11.9 TYPE 2 DIABETES MELLITUS WITHOUT COMPLICATION, WITHOUT LONG-TERM CURRENT USE OF INSULIN (HCC): ICD-10-CM

## 2022-11-09 DIAGNOSIS — I10 ESSENTIAL HYPERTENSION: ICD-10-CM

## 2022-11-09 RX ORDER — ROSUVASTATIN CALCIUM 40 MG/1
40 TABLET, COATED ORAL EVERY EVENING
Qty: 90 TABLET | Refills: 1 | Status: SHIPPED | OUTPATIENT
Start: 2022-11-09 | End: 2023-05-08

## 2022-11-09 RX ORDER — LEVOTHYROXINE SODIUM 175 UG/1
TABLET ORAL
Qty: 90 TABLET | Refills: 0 | Status: SHIPPED | OUTPATIENT
Start: 2022-11-09

## 2022-11-09 RX ORDER — LISINOPRIL 10 MG/1
10 TABLET ORAL DAILY
Qty: 90 TABLET | Refills: 1 | Status: SHIPPED | OUTPATIENT
Start: 2022-11-09

## 2022-11-09 NOTE — TELEPHONE ENCOUNTER
Guerline Morris is requesting a refill on the following medication(s):  Requested Prescriptions     Pending Prescriptions Disp Refills    levothyroxine (SYNTHROID) 175 MCG tablet 90 tablet 0     Sig: Take 1 tablet by mouth once daily    lisinopril (PRINIVIL;ZESTRIL) 10 MG tablet 90 tablet 1     Sig: Take 1 tablet by mouth daily    metFORMIN (GLUCOPHAGE) 500 MG tablet 360 tablet 1     Sig: Take 2 tablets by mouth 2 times daily (with meals)    rosuvastatin (CRESTOR) 40 MG tablet 90 tablet 1     Sig: Take 1 tablet by mouth every evening       Last Visit Date (If Applicable):  6/06/2035    Next Visit Date:    12/22/2022

## 2022-12-19 LAB
ALBUMIN/GLOBULIN RATIO: 1.7 G/DL
ALBUMIN: 4 G/DL (ref 3.5–5)
ALP BLD-CCNC: 56 UNITS/L (ref 38–126)
ALT SERPL-CCNC: 19 UNITS/L (ref 4–50)
ANION GAP SERPL CALCULATED.3IONS-SCNC: 10.3 MMOL/L
AST SERPL-CCNC: 17 UNITS/L (ref 17–59)
BILIRUB SERPL-MCNC: 0.4 MG/DL (ref 0.2–1.3)
BUN BLDV-MCNC: 26 MG/DL (ref 9–20)
CALCIUM SERPL-MCNC: 10.6 MG/DL (ref 8.4–10.2)
CHLORIDE BLD-SCNC: 104 MMOL/L (ref 98–120)
CHOLESTEROL/HDL RATIO: 2 RATIO (ref 0–4.5)
CHOLESTEROL: 104 MG/DL (ref 50–200)
CO2: 31 MMOL/L (ref 22–31)
CREAT SERPL-MCNC: 1.4 MG/DL (ref 0.7–1.3)
GFR CALCULATED: 53.9
GLOBULIN: 2.4 G/DL
GLUCOSE: 126 MG/DL (ref 75–110)
HDLC SERPL-MCNC: 52 MG/DL (ref 36–68)
LDL CHOLESTEROL CALCULATED: 33.8 MG/DL (ref 0–160)
POTASSIUM SERPL-SCNC: 5.3 MMOL/L (ref 3.6–5)
SODIUM BLD-SCNC: 140 MMOL/L (ref 135–145)
TOTAL PROTEIN, SERUM: 6.4 G/DL (ref 6.3–8.2)
TRIGL SERPL-MCNC: 91 MG/DL (ref 10–250)
VLDLC SERPL CALC-MCNC: 18 MG/DL (ref 0–50)

## 2022-12-19 SDOH — HEALTH STABILITY: PHYSICAL HEALTH: ON AVERAGE, HOW MANY MINUTES DO YOU ENGAGE IN EXERCISE AT THIS LEVEL?: 20 MIN

## 2022-12-19 SDOH — HEALTH STABILITY: PHYSICAL HEALTH: ON AVERAGE, HOW MANY DAYS PER WEEK DO YOU ENGAGE IN MODERATE TO STRENUOUS EXERCISE (LIKE A BRISK WALK)?: 1 DAY

## 2022-12-21 ENCOUNTER — OFFICE VISIT (OUTPATIENT)
Dept: FAMILY MEDICINE CLINIC | Age: 67
End: 2022-12-21
Payer: MEDICARE

## 2022-12-21 VITALS
BODY MASS INDEX: 33.04 KG/M2 | DIASTOLIC BLOOD PRESSURE: 86 MMHG | HEIGHT: 66 IN | WEIGHT: 205.6 LBS | OXYGEN SATURATION: 98 % | HEART RATE: 83 BPM | SYSTOLIC BLOOD PRESSURE: 142 MMHG

## 2022-12-21 DIAGNOSIS — E11.9 TYPE 2 DIABETES MELLITUS WITHOUT COMPLICATION, WITHOUT LONG-TERM CURRENT USE OF INSULIN (HCC): Primary | ICD-10-CM

## 2022-12-21 DIAGNOSIS — Z23 NEED FOR INFLUENZA VACCINATION: ICD-10-CM

## 2022-12-21 DIAGNOSIS — E03.9 ACQUIRED HYPOTHYROIDISM: ICD-10-CM

## 2022-12-21 DIAGNOSIS — Z23 NEED FOR PNEUMOCOCCAL VACCINATION: ICD-10-CM

## 2022-12-21 PROBLEM — M54.50 LOW BACK PAIN: Status: ACTIVE | Noted: 2022-12-21

## 2022-12-21 LAB — HBA1C MFR BLD: 7.1 %

## 2022-12-21 PROCEDURE — 99212 OFFICE O/P EST SF 10 MIN: CPT

## 2022-12-21 PROCEDURE — 90694 VACC AIIV4 NO PRSRV 0.5ML IM: CPT

## 2022-12-21 PROCEDURE — 83036 HEMOGLOBIN GLYCOSYLATED A1C: CPT

## 2022-12-21 PROCEDURE — 90677 PCV20 VACCINE IM: CPT

## 2022-12-21 RX ORDER — BLOOD SUGAR DIAGNOSTIC
STRIP MISCELLANEOUS
COMMUNITY
Start: 2022-10-31

## 2022-12-21 ASSESSMENT — ENCOUNTER SYMPTOMS
DIARRHEA: 0
CHOKING: 0
SHORTNESS OF BREATH: 0
CHEST TIGHTNESS: 0
CONSTIPATION: 0
PHOTOPHOBIA: 0
NAUSEA: 0
COUGH: 0
WHEEZING: 0
ABDOMINAL PAIN: 0
VOMITING: 0

## 2022-12-21 NOTE — PROGRESS NOTES
Devin Sheikh (:  1955) is a 78 y.o. male,Established patient, here for evaluation of the following chief complaint(s):  Diabetes (Pt is here to get established. He had labs done on 22. Pt would like to discuss his TSH and his polymyalgia too. )         ASSESSMENT/PLAN:  1. Type 2 diabetes mellitus without complication, without long-term current use of insulin ([facility] [address]  Assessment & Plan:   At goal, continue current medications and continue current treatment plan A1C 7.1 Today. Continue to take metformin 1000mg twice daily with meals. Diet is discussed as likely this needs to be improved, and he verb understanding of this. Orders:  -     POCT Hb A1C (glycosylated hemoglobin)  2. Need for influenza vaccination  -     Influenza, FLUAD, (age 72 y+), IM, Preservative Free, 0.5 mL  3. Need for pneumococcal vaccination  -     Pneumococcal, PCV20, PREVNAR 21, (age 25 yrs+), IM, PF  4. Acquired hypothyroidism  Assessment & Plan:   Unclear control, continue current medications pending work up below last TSH was notably decreased. We will recheck, if this is the case will decrease dosage of Synthroid. Check verbalized understanding of this. Currently having no side effects to Synthroid 175 mcg tablets once daily. We will continue on this until lab levels are back. Orders:  -     TSH; Future  -     T4, Free; Future      Return in about 6 months (around 2023), or if symptoms worsen or fail to improve. Subjective   SUBJECTIVE/OBJECTIVE:  Julio Pride is here today for an office visit. All past notes, H&P, lab results that are printed was care reviewed at time of appointment. Does have a history of hypertension, hyperlipidemia, hypothyroidism, polymyalgia rheumatica, diabetes mellitus type 2 without insulin. A1c at 6.7 still within goal range. Encourage diet low in carbohydrates and exercise 30 minutes a day with a goal 150 minutes week.   He has been on prednisone 5 mg daily to help with his polymyalgia. This has kept it stable for the last 6 months. I will continue to keep him on 5 mg, if he does have exacerbations then I would recommend he sees rheumatology. He is agreeable to this. We will recheck TSH and free T4 as his TSH level was very low at last recheck. The possibility of having to decrease his Synthroid medication is discussed he verbalized understanding this. Diabetes  Pertinent negatives for hypoglycemia include no confusion, dizziness or headaches. Associated symptoms include fatigue (sometimes). Pertinent negatives for diabetes include no chest pain and no weakness. Review of Systems   Constitutional:  Positive for fatigue (sometimes). Negative for unexpected weight change. Eyes:  Negative for photophobia and visual disturbance. Respiratory:  Negative for cough, choking, chest tightness, shortness of breath and wheezing. Cardiovascular:  Positive for leg swelling (comes and goes chronic). Negative for chest pain and palpitations. Gastrointestinal:  Negative for abdominal pain, constipation, diarrhea, nausea and vomiting. Genitourinary:  Negative for difficulty urinating and hematuria. Musculoskeletal:  Positive for arthralgias (everywhere when his polymyalgia rheumatica acts up) and myalgias. Skin:  Negative for rash and wound. Neurological:  Negative for dizziness, weakness, light-headedness, numbness and headaches. Hematological:  Negative for adenopathy. Does not bruise/bleed easily. Psychiatric/Behavioral:  Negative for agitation, confusion, decreased concentration and suicidal ideas. Objective   Physical Exam  Constitutional:       General: He is not in acute distress. Appearance: Normal appearance. He is obese. He is not ill-appearing or toxic-appearing. HENT:      Head: Normocephalic and atraumatic. Right Ear: Tympanic membrane, ear canal and external ear normal. There is no impacted cerumen.       Left Ear: Tympanic membrane, ear canal and external ear normal. There is no impacted cerumen. Nose: Nose normal.      Mouth/Throat:      Mouth: Mucous membranes are moist.      Pharynx: Oropharynx is clear. Eyes:      Extraocular Movements: Extraocular movements intact. Conjunctiva/sclera: Conjunctivae normal.      Pupils: Pupils are equal, round, and reactive to light. Cardiovascular:      Rate and Rhythm: Normal rate and regular rhythm. Pulses: Normal pulses. Heart sounds: Normal heart sounds. No murmur heard. Pulmonary:      Effort: Pulmonary effort is normal.      Breath sounds: Normal breath sounds. No wheezing, rhonchi or rales. Abdominal:      General: Abdomen is flat. Bowel sounds are normal. There is no distension. Palpations: Abdomen is soft. There is no mass. Tenderness: There is no abdominal tenderness. There is no guarding. Musculoskeletal:         General: Normal range of motion. Cervical back: Normal range of motion and neck supple. Right lower leg: No edema. Left lower leg: No edema. Lymphadenopathy:      Cervical: No cervical adenopathy. Skin:     General: Skin is warm. Capillary Refill: Capillary refill takes less than 2 seconds. Neurological:      General: No focal deficit present. Mental Status: He is alert and oriented to person, place, and time. Gait: Gait normal.   Psychiatric:         Mood and Affect: Mood normal.         Behavior: Behavior normal.         Thought Content: Thought content normal.                An electronic signature was used to authenticate this note.     --MARY King - CNP

## 2022-12-21 NOTE — ASSESSMENT & PLAN NOTE
At goal, continue current medications and continue current treatment plan A1C 7.1 Today. Continue to take metformin 1000mg twice daily with meals. Diet is discussed as likely this needs to be improved, and he verb understanding of this.

## 2022-12-21 NOTE — ASSESSMENT & PLAN NOTE
Unclear control, continue current medications pending work up below last TSH was notably decreased. We will recheck, if this is the case will decrease dosage of Synthroid. Check verbalized understanding of this. Currently having no side effects to Synthroid 175 mcg tablets once daily. We will continue on this until lab levels are back.

## 2023-03-08 RX ORDER — PREDNISONE 1 MG/1
5 TABLET ORAL DAILY
Qty: 90 TABLET | Refills: 0 | Status: SHIPPED | OUTPATIENT
Start: 2023-03-08

## 2023-03-20 DIAGNOSIS — E03.9 ACQUIRED HYPOTHYROIDISM: ICD-10-CM

## 2023-03-20 RX ORDER — LEVOTHYROXINE SODIUM 175 UG/1
TABLET ORAL
Qty: 90 TABLET | Refills: 0 | Status: SHIPPED | OUTPATIENT
Start: 2023-03-20

## 2023-05-25 DIAGNOSIS — I10 ESSENTIAL HYPERTENSION: ICD-10-CM

## 2023-05-25 RX ORDER — LISINOPRIL 10 MG/1
TABLET ORAL
Qty: 90 TABLET | Refills: 0 | Status: SHIPPED | OUTPATIENT
Start: 2023-05-25

## 2023-05-25 NOTE — TELEPHONE ENCOUNTER
Milton Merchant is requesting a refill on the following medication(s):  Requested Prescriptions     Pending Prescriptions Disp Refills    lisinopril (PRINIVIL;ZESTRIL) 10 MG tablet [Pharmacy Med Name: Lisinopril 10 MG Oral Tablet] 90 tablet 0     Sig: Take 1 tablet by mouth once daily       Last Visit Date (If Applicable):  81/15/5490    Next Visit Date:    6/22/2023

## 2023-06-11 SDOH — HEALTH STABILITY: PHYSICAL HEALTH: ON AVERAGE, HOW MANY DAYS PER WEEK DO YOU ENGAGE IN MODERATE TO STRENUOUS EXERCISE (LIKE A BRISK WALK)?: 3 DAYS

## 2023-06-11 SDOH — HEALTH STABILITY: PHYSICAL HEALTH: ON AVERAGE, HOW MANY MINUTES DO YOU ENGAGE IN EXERCISE AT THIS LEVEL?: 30 MIN

## 2023-06-11 ASSESSMENT — PATIENT HEALTH QUESTIONNAIRE - PHQ9
SUM OF ALL RESPONSES TO PHQ QUESTIONS 1-9: 0
SUM OF ALL RESPONSES TO PHQ QUESTIONS 1-9: 0
1. LITTLE INTEREST OR PLEASURE IN DOING THINGS: 0
SUM OF ALL RESPONSES TO PHQ QUESTIONS 1-9: 0
2. FEELING DOWN, DEPRESSED OR HOPELESS: 0
SUM OF ALL RESPONSES TO PHQ9 QUESTIONS 1 & 2: 0
SUM OF ALL RESPONSES TO PHQ QUESTIONS 1-9: 0

## 2023-06-11 ASSESSMENT — LIFESTYLE VARIABLES
HOW MANY STANDARD DRINKS CONTAINING ALCOHOL DO YOU HAVE ON A TYPICAL DAY: 3 OR 4
HOW OFTEN DURING THE LAST YEAR HAVE YOU NEEDED AN ALCOHOLIC DRINK FIRST THING IN THE MORNING TO GET YOURSELF GOING AFTER A NIGHT OF HEAVY DRINKING: NEVER
HOW OFTEN DURING THE LAST YEAR HAVE YOU FAILED TO DO WHAT WAS NORMALLY EXPECTED FROM YOU BECAUSE OF DRINKING: NEVER
HOW OFTEN DO YOU HAVE SIX OR MORE DRINKS ON ONE OCCASION: 1
HOW OFTEN DURING THE LAST YEAR HAVE YOU BEEN UNABLE TO REMEMBER WHAT HAPPENED THE NIGHT BEFORE BECAUSE YOU HAD BEEN DRINKING: NEVER
HOW OFTEN DURING THE LAST YEAR HAVE YOU HAD A FEELING OF GUILT OR REMORSE AFTER DRINKING: NEVER
HAVE YOU OR SOMEONE ELSE BEEN INJURED AS A RESULT OF YOUR DRINKING: NO
HAS A RELATIVE, FRIEND, DOCTOR, OR ANOTHER HEALTH PROFESSIONAL EXPRESSED CONCERN ABOUT YOUR DRINKING OR SUGGESTED YOU CUT DOWN: 4
HOW OFTEN DO YOU HAVE A DRINK CONTAINING ALCOHOL: 4 OR MORE TIMES A WEEK
HAVE YOU OR SOMEONE ELSE BEEN INJURED AS A RESULT OF YOUR DRINKING: 0
HOW OFTEN DO YOU HAVE A DRINK CONTAINING ALCOHOL: 5
HOW OFTEN DURING THE LAST YEAR HAVE YOU FOUND THAT YOU WERE NOT ABLE TO STOP DRINKING ONCE YOU HAD STARTED: 0
HOW MANY STANDARD DRINKS CONTAINING ALCOHOL DO YOU HAVE ON A TYPICAL DAY: 2
HOW OFTEN DURING THE LAST YEAR HAVE YOU FAILED TO DO WHAT WAS NORMALLY EXPECTED FROM YOU BECAUSE OF DRINKING: 0
HOW OFTEN DURING THE LAST YEAR HAVE YOU NEEDED AN ALCOHOLIC DRINK FIRST THING IN THE MORNING TO GET YOURSELF GOING AFTER A NIGHT OF HEAVY DRINKING: 0
HAS A RELATIVE, FRIEND, DOCTOR, OR ANOTHER HEALTH PROFESSIONAL EXPRESSED CONCERN ABOUT YOUR DRINKING OR SUGGESTED YOU CUT DOWN: YES, DURING THE PAST YEAR
HOW OFTEN DURING THE LAST YEAR HAVE YOU BEEN UNABLE TO REMEMBER WHAT HAPPENED THE NIGHT BEFORE BECAUSE YOU HAD BEEN DRINKING: 0
HOW OFTEN DURING THE LAST YEAR HAVE YOU FOUND THAT YOU WERE NOT ABLE TO STOP DRINKING ONCE YOU HAD STARTED: NEVER
HOW OFTEN DURING THE LAST YEAR HAVE YOU HAD A FEELING OF GUILT OR REMORSE AFTER DRINKING: 0

## 2023-06-22 ENCOUNTER — OFFICE VISIT (OUTPATIENT)
Dept: FAMILY MEDICINE CLINIC | Age: 68
End: 2023-06-22
Payer: MEDICARE

## 2023-06-22 VITALS
DIASTOLIC BLOOD PRESSURE: 72 MMHG | HEART RATE: 71 BPM | OXYGEN SATURATION: 96 % | SYSTOLIC BLOOD PRESSURE: 138 MMHG | BODY MASS INDEX: 30.53 KG/M2 | HEIGHT: 66 IN | WEIGHT: 190 LBS

## 2023-06-22 DIAGNOSIS — Z00.00 MEDICARE ANNUAL WELLNESS VISIT, SUBSEQUENT: Primary | ICD-10-CM

## 2023-06-22 DIAGNOSIS — G89.29 CHRONIC LEFT-SIDED LOW BACK PAIN WITHOUT SCIATICA: ICD-10-CM

## 2023-06-22 DIAGNOSIS — E03.9 ACQUIRED HYPOTHYROIDISM: ICD-10-CM

## 2023-06-22 DIAGNOSIS — M54.50 CHRONIC LEFT-SIDED LOW BACK PAIN WITHOUT SCIATICA: ICD-10-CM

## 2023-06-22 DIAGNOSIS — E11.9 TYPE 2 DIABETES MELLITUS WITHOUT COMPLICATION, WITHOUT LONG-TERM CURRENT USE OF INSULIN (HCC): ICD-10-CM

## 2023-06-22 DIAGNOSIS — M35.3 POLYMYALGIA RHEUMATICA (HCC): ICD-10-CM

## 2023-06-22 LAB — HBA1C MFR BLD: 6 %

## 2023-06-22 PROCEDURE — 3075F SYST BP GE 130 - 139MM HG: CPT

## 2023-06-22 PROCEDURE — PBSHW POCT GLYCOSYLATED HEMOGLOBIN (HGB A1C)

## 2023-06-22 PROCEDURE — 83037 HB GLYCOSYLATED A1C HOME DEV: CPT

## 2023-06-22 PROCEDURE — 3017F COLORECTAL CA SCREEN DOC REV: CPT

## 2023-06-22 PROCEDURE — 3078F DIAST BP <80 MM HG: CPT

## 2023-06-22 PROCEDURE — 3044F HG A1C LEVEL LT 7.0%: CPT

## 2023-06-22 PROCEDURE — G0439 PPPS, SUBSEQ VISIT: HCPCS

## 2023-06-22 PROCEDURE — 1123F ACP DISCUSS/DSCN MKR DOCD: CPT

## 2023-06-22 PROCEDURE — 99212 OFFICE O/P EST SF 10 MIN: CPT

## 2023-06-22 RX ORDER — PREDNISONE 5 MG/1
5 TABLET ORAL DAILY
Qty: 90 TABLET | Refills: 0 | Status: SHIPPED | OUTPATIENT
Start: 2023-06-22

## 2023-06-22 RX ORDER — LANCETS 30 GAUGE
1 EACH MISCELLANEOUS DAILY
Qty: 100 EACH | Refills: 1 | Status: SHIPPED | OUTPATIENT
Start: 2023-06-22

## 2023-06-22 RX ORDER — LEVOTHYROXINE SODIUM 175 UG/1
TABLET ORAL
Qty: 90 TABLET | Refills: 0 | Status: SHIPPED | OUTPATIENT
Start: 2023-06-22

## 2023-06-22 SDOH — ECONOMIC STABILITY: FOOD INSECURITY: WITHIN THE PAST 12 MONTHS, THE FOOD YOU BOUGHT JUST DIDN'T LAST AND YOU DIDN'T HAVE MONEY TO GET MORE.: NEVER TRUE

## 2023-06-22 SDOH — ECONOMIC STABILITY: FOOD INSECURITY: WITHIN THE PAST 12 MONTHS, YOU WORRIED THAT YOUR FOOD WOULD RUN OUT BEFORE YOU GOT MONEY TO BUY MORE.: NEVER TRUE

## 2023-06-22 SDOH — ECONOMIC STABILITY: HOUSING INSECURITY
IN THE LAST 12 MONTHS, WAS THERE A TIME WHEN YOU DID NOT HAVE A STEADY PLACE TO SLEEP OR SLEPT IN A SHELTER (INCLUDING NOW)?: NO

## 2023-06-22 SDOH — ECONOMIC STABILITY: INCOME INSECURITY: HOW HARD IS IT FOR YOU TO PAY FOR THE VERY BASICS LIKE FOOD, HOUSING, MEDICAL CARE, AND HEATING?: NOT HARD AT ALL

## 2023-06-22 NOTE — ASSESSMENT & PLAN NOTE
At goal, continue current medications and continue current treatment plan A1c 6 today and well within goal range. This is an improvement from last A1c. We will continue on metformin 1000 mg twice daily with meals. Continue diet low in carbohydrates, exercise 30 minutes a day with a goal 150 minutes week.

## 2023-06-22 NOTE — PROGRESS NOTES
Medicare Annual Wellness Visit    Zoya Ye is here for Medicare AWV (Pt is here for a medicare wellness visit. ) and Diabetes    Assessment & Plan   Medicare annual wellness visit, subsequent  Acquired hypothyroidism  Assessment & Plan:   At goal, continue current medications and continue current treatment plan will continue to monitor TSH and free T4. Plan to have lab draw after next visit. Denies any tachycardia, feeling heart racing, unintentional weight loss. Orders:  -     levothyroxine (SYNTHROID) 175 MCG tablet; Take 1 tablet by mouth once daily, Disp-90 tablet, R-0Normal  Type 2 diabetes mellitus without complication, without long-term current use of insulin ([facility])  Assessment & Plan:   At goal, continue current medications and continue current treatment plan A1c 6 today and well within goal range. This is an improvement from last A1c. We will continue on metformin 1000 mg twice daily with meals. Continue diet low in carbohydrates, exercise 30 minutes a day with a goal 150 minutes week. Orders:  -     POCT Hb A1C (glycosylated hemoglobin)  -     Lancets MISC; DAILY Starting Thu 6/22/2023, Disp-100 each, R-1, Normal  Polymyalgia rheumatica ([facility] [address]  Assessment & Plan:   At goal, continue current medications and continue current treatment plan  Chronic left-sided low back pain without sciatica  Assessment & Plan:   Low back pain which is chronic greater than 5 years. Would like exercises to help strengthen this. Will be provided in after visit summary as well as recommend and referral placed for physical therapy. He is agreeable to this and denies any questions. Denies any associated symptoms such as perianal numbness, saddle paresthesia, or any difficulties ambulating.   Orders:  -     External Referral To Physical Therapy    Recommendations for Preventive Services Due: see orders and patient instructions/AVS.  Recommended screening schedule for the next 5-10 years is provided to the patient in

## 2023-06-22 NOTE — ASSESSMENT & PLAN NOTE
Low back pain which is chronic greater than 5 years. Would like exercises to help strengthen this. Will be provided in after visit summary as well as recommend and referral placed for physical therapy. He is agreeable to this and denies any questions. Denies any associated symptoms such as perianal numbness, saddle paresthesia, or any difficulties ambulating.

## 2023-06-22 NOTE — ASSESSMENT & PLAN NOTE
At goal, continue current medications and continue current treatment plan will continue to monitor TSH and free T4. Plan to have lab draw after next visit. Denies any tachycardia, feeling heart racing, unintentional weight loss.

## 2023-06-22 NOTE — PATIENT INSTRUCTIONS
dental visit is recommended every 6 months. Try to get at least 150 minutes of exercise per week or 10,000 steps per day on a pedometer . Order or download the FREE \"Exercise & Physical Activity: Your Everyday Guide\" from [employer]. Call 4-592.191.6310 or search [employer] online. You need 3395-5492 mg of calcium and 7399-9221 IU of vitamin D per day. It is possible to meet your calcium requirement with diet alone, but a vitamin D supplement is usually necessary to meet this goal.  When exposed to the sun, use a sunscreen that protects against both UVA and UVB radiation with an SPF of 30 or greater. Reapply every 2 to 3 hours or after sweating, drying off with a towel, or swimming. Always wear a seat belt when traveling in a car. Always wear a helmet when riding a bicycle or motorcycle.

## 2023-08-19 DIAGNOSIS — I10 ESSENTIAL HYPERTENSION: ICD-10-CM

## 2023-08-21 RX ORDER — LISINOPRIL 10 MG/1
TABLET ORAL
Qty: 90 TABLET | Refills: 0 | Status: SHIPPED | OUTPATIENT
Start: 2023-08-21 | End: 2023-08-24

## 2023-08-21 NOTE — TELEPHONE ENCOUNTER
Jennifer Farfan is requesting a refill on the following medication(s):  Requested Prescriptions     Pending Prescriptions Disp Refills    lisinopril (PRINIVIL;ZESTRIL) 10 MG tablet [Pharmacy Med Name: Lisinopril 10 MG Oral Tablet] 90 tablet 0     Sig: Take 1 tablet by mouth once daily       Last Visit Date (If Applicable):  1/98/5950    Next Visit Date:    12/27/2023

## 2023-08-24 DIAGNOSIS — E11.9 TYPE 2 DIABETES MELLITUS WITHOUT COMPLICATION, WITHOUT LONG-TERM CURRENT USE OF INSULIN (HCC): ICD-10-CM

## 2023-08-24 DIAGNOSIS — I10 ESSENTIAL HYPERTENSION: ICD-10-CM

## 2023-08-24 RX ORDER — ROSUVASTATIN CALCIUM 40 MG/1
TABLET, COATED ORAL
Qty: 90 TABLET | Refills: 0 | Status: SHIPPED | OUTPATIENT
Start: 2023-08-24

## 2023-08-24 RX ORDER — LISINOPRIL 10 MG/1
TABLET ORAL
Qty: 90 TABLET | Refills: 0 | Status: SHIPPED | OUTPATIENT
Start: 2023-08-24

## 2023-08-24 NOTE — TELEPHONE ENCOUNTER
Leo Means is requesting a refill on the following medication(s):  Requested Prescriptions     Pending Prescriptions Disp Refills    metFORMIN (GLUCOPHAGE) 500 MG tablet [Pharmacy Med Name: metFORMIN HCl 500 MG Oral Tablet] 360 tablet 0     Sig: TAKE 2 TABLETS BY MOUTH TWICE DAILY WITH MEALS    rosuvastatin (CRESTOR) 40 MG tablet [Pharmacy Med Name: Rosuvastatin Calcium 40 MG Oral Tablet] 90 tablet 0     Sig: TAKE 1 TABLET BY MOUTH ONCE DAILY IN THE EVENING    lisinopril (PRINIVIL;ZESTRIL) 10 MG tablet [Pharmacy Med Name: Lisinopril 10 MG Oral Tablet] 90 tablet 0     Sig: Take 1 tablet by mouth once daily       Last Visit Date (If Applicable):  9/56/0242    Next Visit Date:    12/27/2023

## 2023-09-10 DIAGNOSIS — E03.9 ACQUIRED HYPOTHYROIDISM: ICD-10-CM

## 2023-09-11 RX ORDER — LEVOTHYROXINE SODIUM 175 UG/1
TABLET ORAL
Qty: 90 TABLET | Refills: 0 | Status: SHIPPED | OUTPATIENT
Start: 2023-09-11

## 2023-09-11 NOTE — TELEPHONE ENCOUNTER
Moise Pearce is requesting a refill on the following medication(s):  Requested Prescriptions     Pending Prescriptions Disp Refills    levothyroxine (SYNTHROID) 175 MCG tablet [Pharmacy Med Name: Levothyroxine Sodium 175 MCG Oral Tablet] 90 tablet 0     Sig: Take 1 tablet by mouth once daily       Last Visit Date (If Applicable):  5/12/3417    Next Visit Date:    12/27/2023

## 2023-09-19 RX ORDER — PREDNISONE 5 MG/1
5 TABLET ORAL DAILY
Qty: 90 TABLET | Refills: 0 | Status: SHIPPED | OUTPATIENT
Start: 2023-09-19

## 2023-10-05 ENCOUNTER — PATIENT MESSAGE (OUTPATIENT)
Dept: FAMILY MEDICINE CLINIC | Age: 68
End: 2023-10-05

## 2023-10-05 RX ORDER — BLOOD SUGAR DIAGNOSTIC
STRIP MISCELLANEOUS
Qty: 100 EACH | Refills: 3 | Status: SHIPPED | OUTPATIENT
Start: 2023-10-05

## 2023-10-05 NOTE — TELEPHONE ENCOUNTER
From: Gary Hopson  To: Waleska Rodriguez  Sent: 10/5/2023 11:39 AM EDT  Subject: One touch ultra test strips    Army Kitchen, I need test strips for Sugar. It is not available on the Medication renewal request page.    -- One touch ultra test strips --  Thank you, Parisa Altamirano

## 2023-10-05 NOTE — TELEPHONE ENCOUNTER
Mounika Alexander is requesting a refill on the following medication(s):  Requested Prescriptions     Pending Prescriptions Disp Refills    ONETOUCH ULTRA strip 100 each      Sig: USE STRIP TO CHECK GLUCOSE EVERY 8 HOURS       Last Visit Date (If Applicable):  9/64/6417    Next Visit Date:    12/27/2023

## 2023-10-09 DIAGNOSIS — E11.9 TYPE 2 DIABETES MELLITUS WITHOUT COMPLICATION, WITHOUT LONG-TERM CURRENT USE OF INSULIN (HCC): ICD-10-CM

## 2023-10-09 RX ORDER — BLOOD-GLUCOSE METER
1 EACH MISCELLANEOUS DAILY
Qty: 1 KIT | Refills: 0 | Status: SHIPPED | OUTPATIENT
Start: 2023-10-09

## 2023-10-09 NOTE — TELEPHONE ENCOUNTER
Please resend prescription with the dx code. Pharmacy needs it to bill medicare.  Thank you    Moise Pearce is requesting a refill on the following medication(s):  Requested Prescriptions     Pending Prescriptions Disp Refills    ONETOUCH ULTRA strip 100 each 3     Sig: USE STRIP TO CHECK GLUCOSE EVERY 8 HOURS       Last Visit Date (If Applicable):  0/54/2418    Next Visit Date:    12/27/2023

## 2023-10-09 NOTE — TELEPHONE ENCOUNTER
Can you send a new script. Pharmacy needed a dx code attached.             Eve Estrada is requesting a refill on the following medication(s):  Requested Prescriptions     Pending Prescriptions Disp Refills    Blood Glucose Monitoring Suppl (ONE TOUCH ULTRA 2) w/Device KIT 1 kit 0     Si kit by Does not apply route daily       Last Visit Date (If Applicable):      Next Visit Date:    2023 52.1 66.6

## 2023-10-10 RX ORDER — BLOOD SUGAR DIAGNOSTIC
STRIP MISCELLANEOUS
Qty: 100 EACH | Refills: 3 | Status: SHIPPED | OUTPATIENT
Start: 2023-10-10

## 2023-10-16 DIAGNOSIS — E11.9 TYPE 2 DIABETES MELLITUS WITHOUT COMPLICATION, WITHOUT LONG-TERM CURRENT USE OF INSULIN (HCC): ICD-10-CM

## 2023-10-16 RX ORDER — BLOOD SUGAR DIAGNOSTIC
STRIP MISCELLANEOUS
Qty: 100 EACH | Refills: 3 | Status: SHIPPED | OUTPATIENT
Start: 2023-10-16 | End: 2023-10-19

## 2023-10-16 NOTE — TELEPHONE ENCOUNTER
The icd 10 code is not sending with script.  For medicare purposes they need a new script             Lavern Nick is requesting a refill on the following medication(s):  Requested Prescriptions     Pending Prescriptions Disp Refills    ONETOUCH ULTRA strip 100 each 3     Sig: USE STRIP TO CHECK GLUCOSE EVERY 8 HOURS       Last Visit Date (If Applicable):  3/11/1638    Next Visit Date:    12/27/2023

## 2023-10-19 ENCOUNTER — TELEPHONE (OUTPATIENT)
Dept: FAMILY MEDICINE CLINIC | Age: 68
End: 2023-10-19

## 2023-10-19 DIAGNOSIS — E11.9 TYPE 2 DIABETES MELLITUS WITHOUT COMPLICATION, WITHOUT LONG-TERM CURRENT USE OF INSULIN (HCC): ICD-10-CM

## 2023-10-19 RX ORDER — LANCETS 30 GAUGE
1 EACH MISCELLANEOUS DAILY
Qty: 100 EACH | Refills: 1 | Status: SHIPPED | OUTPATIENT
Start: 2023-10-19

## 2023-10-19 RX ORDER — GLUCOSAMINE HCL/CHONDROITIN SU 500-400 MG
CAPSULE ORAL
Qty: 100 STRIP | Refills: 2 | Status: SHIPPED | OUTPATIENT
Start: 2023-10-19

## 2023-10-19 NOTE — TELEPHONE ENCOUNTER
Delores Cox is requesting a refill on the following medication(s):  Requested Prescriptions     Pending Prescriptions Disp Refills    Lancets MISC 100 each 1     Si each by Does not apply route daily       Last Visit Date (If Applicable):  3/19/6229    Next Visit Date:    2023

## 2023-10-19 NOTE — TELEPHONE ENCOUNTER
Pt called stating that the one touch unltra 1 device kit is not covered with his insurance. He did get approved but they still have to pay for it. Pt states that he called his insurance and they said they will cover true air metric meter with the strips. Please send it script.  Thank you

## 2023-10-20 DIAGNOSIS — E11.9 TYPE 2 DIABETES MELLITUS WITHOUT COMPLICATION, WITHOUT LONG-TERM CURRENT USE OF INSULIN (HCC): ICD-10-CM

## 2023-10-20 NOTE — TELEPHONE ENCOUNTER
Requested Prescriptions     Pending Prescriptions Disp Refills    blood glucose monitor strips 100 strip 2     Sig: Test 2 times times a day Dispense sufficient amount for indicated testing frequency plus additional to accommodate true air metric meter concepcion     Pt presented to the window with a bag of his blood glucose supplies and his wife's blood glucose supplies. Pt stated that everything was correct EXCEPT his blood glucose strips. Pt is requesting the True Metrix Glucose Fiona Nip to be sent to [facility] in [place]. Pt wife has these same ones, and pt stated that those are what he needs as well. The wrong ones were sent in for him. Please advise.

## 2023-10-24 RX ORDER — GLUCOSAMINE HCL/CHONDROITIN SU 500-400 MG
CAPSULE ORAL
Qty: 100 STRIP | Refills: 2 | Status: SHIPPED | OUTPATIENT
Start: 2023-10-24

## 2023-11-24 DIAGNOSIS — E11.9 TYPE 2 DIABETES MELLITUS WITHOUT COMPLICATION, WITHOUT LONG-TERM CURRENT USE OF INSULIN (HCC): ICD-10-CM

## 2023-11-27 RX ORDER — ROSUVASTATIN CALCIUM 40 MG/1
TABLET, COATED ORAL
Qty: 90 TABLET | Refills: 0 | Status: SHIPPED | OUTPATIENT
Start: 2023-11-27

## 2023-11-27 NOTE — TELEPHONE ENCOUNTER
Gilma Titus is requesting a refill on the following medication(s):  Requested Prescriptions     Pending Prescriptions Disp Refills    metFORMIN (GLUCOPHAGE) 500 MG tablet [Pharmacy Med Name: metFORMIN HCl 500 MG Oral Tablet] 360 tablet 0     Sig: TAKE 2 TABLETS BY MOUTH TWICE DAILY WITH MEALS    rosuvastatin (CRESTOR) 40 MG tablet [Pharmacy Med Name: Rosuvastatin Calcium 40 MG Oral Tablet] 90 tablet 0     Sig: TAKE 1 TABLET BY MOUTH ONCE DAILY IN THE EVENING       Last Visit Date (If Applicable):  0/49/0144    Next Visit Date:    12/27/2023

## 2023-12-05 ENCOUNTER — PATIENT MESSAGE (OUTPATIENT)
Dept: FAMILY MEDICINE CLINIC | Age: 68
End: 2023-12-05

## 2023-12-05 DIAGNOSIS — E03.9 ACQUIRED HYPOTHYROIDISM: Primary | ICD-10-CM

## 2023-12-05 DIAGNOSIS — M35.3 POLYMYALGIA RHEUMATICA (HCC): ICD-10-CM

## 2023-12-05 DIAGNOSIS — I10 ESSENTIAL HYPERTENSION: ICD-10-CM

## 2023-12-05 DIAGNOSIS — E78.1 HYPERTRIGLYCERIDEMIA: ICD-10-CM

## 2023-12-05 DIAGNOSIS — E78.6 HDL DEFICIENCY: ICD-10-CM

## 2023-12-05 DIAGNOSIS — N18.32 STAGE 3B CHRONIC KIDNEY DISEASE (HCC): ICD-10-CM

## 2023-12-05 DIAGNOSIS — E78.5 HYPERLIPIDEMIA, UNSPECIFIED HYPERLIPIDEMIA TYPE: ICD-10-CM

## 2023-12-05 DIAGNOSIS — E11.9 TYPE 2 DIABETES MELLITUS WITHOUT COMPLICATION, WITHOUT LONG-TERM CURRENT USE OF INSULIN (HCC): ICD-10-CM

## 2023-12-05 DIAGNOSIS — Z12.5 PROSTATE CANCER SCREENING ENCOUNTER, OPTIONS AND RISKS DISCUSSED: ICD-10-CM

## 2023-12-05 NOTE — PROGRESS NOTES
Please have check obtain his labs prior to us refilling his Synthroid. I appreciate him bringing that to my attention.   Thank you

## 2023-12-05 NOTE — TELEPHONE ENCOUNTER
From: Mitzy Stokes  To: Andreina Ivan  Sent: 12/5/2023 11:03 AM EST  Subject: Levothyroxine - Visit on Dec 27    Marie Patton,   I have an appointment with you on Dec 27th but I will be 8 pills short on my prescription for Levothyroxine my only concern is it has been a year since my last blood test. if you say no worries then I will go ahead with a request to refill.   Thank you Nilam Fuller

## 2023-12-09 DIAGNOSIS — E03.9 ACQUIRED HYPOTHYROIDISM: ICD-10-CM

## 2023-12-11 RX ORDER — LEVOTHYROXINE SODIUM 175 UG/1
TABLET ORAL
Qty: 90 TABLET | Refills: 0 | Status: SHIPPED | OUTPATIENT
Start: 2023-12-11 | End: 2023-12-12 | Stop reason: SDUPTHER

## 2023-12-11 NOTE — TELEPHONE ENCOUNTER
Reji Handley is requesting a refill on the following medication(s):  Requested Prescriptions     Pending Prescriptions Disp Refills    levothyroxine (SYNTHROID) 175 MCG tablet [Pharmacy Med Name: Levothyroxine Sodium 175 MCG Oral Tablet] 90 tablet 0     Sig: Take 1 tablet by mouth once daily       Last Visit Date (If Applicable):  4/86/5162    Next Visit Date:    12/27/2023

## 2023-12-12 DIAGNOSIS — E03.9 ACQUIRED HYPOTHYROIDISM: ICD-10-CM

## 2023-12-12 DIAGNOSIS — N18.32 STAGE 3B CHRONIC KIDNEY DISEASE (HCC): ICD-10-CM

## 2023-12-12 DIAGNOSIS — E11.9 TYPE 2 DIABETES MELLITUS WITHOUT COMPLICATION, WITHOUT LONG-TERM CURRENT USE OF INSULIN (HCC): Primary | ICD-10-CM

## 2023-12-12 LAB
ALBUMIN/GLOBULIN RATIO: 1.7 G/DL
ALBUMIN: 3.9 G/DL (ref 3.5–5)
ALP BLD-CCNC: 47 UNITS/L (ref 38–126)
ALT SERPL-CCNC: 19 UNITS/L (ref 4–50)
ANION GAP SERPL CALCULATED.3IONS-SCNC: 10.6 MMOL/L (ref 12–16)
AST SERPL-CCNC: 26 UNITS/L (ref 17–59)
BASOPHILS %: 1.61 (ref 0–3)
BASOPHILS ABSOLUTE: 0.12 (ref 0–0.3)
BILIRUB SERPL-MCNC: 0.5 MG/DL (ref 0.2–1.3)
BUN BLDV-MCNC: 35 MG/DL (ref 9–20)
CALCIUM SERPL-MCNC: 10.1 MG/DL (ref 8.4–10.2)
CHLORIDE BLD-SCNC: 105 MMOL/L (ref 98–120)
CHOLESTEROL/HDL RATIO: 2 RATIO (ref 0–4.5)
CHOLESTEROL: 100 MG/DL (ref 50–200)
CO2: 23 MMOL/L (ref 22–31)
CREAT SERPL-MCNC: 1.9 MG/DL (ref 0.7–1.3)
DIAGNOSTIC PSA: 1.24 NG/ML (ref 0–4)
EOSINOPHILS %: 3.35 (ref 0–10)
EOSINOPHILS ABSOLUTE: 0.25 (ref 0–1.1)
GFR CALCULATED: 37.8
GLOBULIN: 2.4 G/DL
GLUCOSE: 122 MG/DL (ref 75–110)
HCT VFR BLD CALC: 36.9 % (ref 42–52)
HDLC SERPL-MCNC: 52 MG/DL (ref 36–68)
HEMOGLOBIN: 11.4 (ref 13.8–17.8)
LDL CHOLESTEROL CALCULATED: 33.2 MG/DL (ref 0–160)
LYMPHOCYTE %: 21.1 (ref 20–51.1)
LYMPHOCYTES ABSOLUTE: 1.58 (ref 1–5.5)
MCH RBC QN AUTO: 27.5 PG (ref 28.5–32.5)
MCHC RBC AUTO-ENTMCNC: 30.9 G/DL (ref 32–37)
MCV RBC AUTO: 88.9 FL (ref 80–94)
MONOCYTES %: 4.78 (ref 1.7–9.3)
MONOCYTES ABSOLUTE: 0.36 (ref 0.1–1)
NEUTROPHILS %: 69.16 (ref 42.2–75.2)
NEUTROPHILS ABSOLUTE: 5.17 (ref 2–8.1)
PDW BLD-RTO: 13.4 % (ref 10–15.5)
PLATELET # BLD: 287.9 THOU/MM3 (ref 130–400)
POTASSIUM SERPL-SCNC: 4.3 MMOL/L (ref 3.6–5)
RBC: 4.15 M/UL (ref 4.7–6.1)
SODIUM BLD-SCNC: 139 MMOL/L (ref 135–145)
T4 FREE: 1.92 NG/DL (ref 0.78–2.19)
TOTAL PROTEIN, SERUM: 6.3 G/DL (ref 6.3–8.2)
TRIGL SERPL-MCNC: 74 MG/DL (ref 10–250)
TSH SERPL DL<=0.05 MIU/L-ACNC: 0.09 MIU/ML (ref 0.49–4.67)
VLDLC SERPL CALC-MCNC: 15 MG/DL (ref 0–50)
WBC: 7.5 THOU/ML3 (ref 4.8–10.8)

## 2023-12-12 RX ORDER — LEVOTHYROXINE SODIUM 137 UG/1
137 TABLET ORAL DAILY
Qty: 90 TABLET | Refills: 0 | Status: SHIPPED | OUTPATIENT
Start: 2023-12-12

## 2023-12-13 ENCOUNTER — TELEPHONE (OUTPATIENT)
Dept: NEPHROLOGY | Age: 68
End: 2023-12-13

## 2023-12-13 NOTE — TELEPHONE ENCOUNTER
12/13/2023 Writer spoke with patient. Patient states he would like to scheduled with Nephrologist when at [facility] office, Katelyn lives in [facility], ref Hem[facility] Can, [address]  faxed labs and referral to [facility] to contact patient to be scheduled at the [facility] when Dr Tonny Foley / Dr Taylor Heads are there in office. Patient was given [facility] telephone number if he has any questions.

## 2023-12-23 DIAGNOSIS — E11.9 TYPE 2 DIABETES MELLITUS WITHOUT COMPLICATION, WITHOUT LONG-TERM CURRENT USE OF INSULIN (HCC): ICD-10-CM

## 2023-12-26 DIAGNOSIS — E11.9 TYPE 2 DIABETES MELLITUS WITHOUT COMPLICATION, WITHOUT LONG-TERM CURRENT USE OF INSULIN (HCC): ICD-10-CM

## 2023-12-26 NOTE — TELEPHONE ENCOUNTER
Rafael Mata is requesting a refill on the following medication(s):  Requested Prescriptions     Pending Prescriptions Disp Refills    Lancets MISC 100 each 1     Si each by Does not apply route daily       Last Visit Date (If Applicable):      Next Visit Date:    2023

## 2023-12-27 ENCOUNTER — OFFICE VISIT (OUTPATIENT)
Dept: FAMILY MEDICINE CLINIC | Age: 68
End: 2023-12-27
Payer: MEDICARE

## 2023-12-27 VITALS
DIASTOLIC BLOOD PRESSURE: 80 MMHG | HEART RATE: 94 BPM | OXYGEN SATURATION: 96 % | HEIGHT: 66 IN | WEIGHT: 193 LBS | SYSTOLIC BLOOD PRESSURE: 132 MMHG | BODY MASS INDEX: 31.02 KG/M2

## 2023-12-27 DIAGNOSIS — R35.1 BPH ASSOCIATED WITH NOCTURIA: ICD-10-CM

## 2023-12-27 DIAGNOSIS — N18.32 STAGE 3B CHRONIC KIDNEY DISEASE (HCC): ICD-10-CM

## 2023-12-27 DIAGNOSIS — E11.9 TYPE 2 DIABETES MELLITUS WITHOUT COMPLICATION, WITHOUT LONG-TERM CURRENT USE OF INSULIN (HCC): Primary | ICD-10-CM

## 2023-12-27 DIAGNOSIS — N40.1 BPH ASSOCIATED WITH NOCTURIA: ICD-10-CM

## 2023-12-27 PROBLEM — N18.30 CHRONIC RENAL DISEASE, STAGE III (HCC): Status: ACTIVE | Noted: 2023-12-27

## 2023-12-27 LAB — HBA1C MFR BLD: 7.2 %

## 2023-12-27 PROCEDURE — 2022F DILAT RTA XM EVC RTNOPTHY: CPT

## 2023-12-27 PROCEDURE — 1123F ACP DISCUSS/DSCN MKR DOCD: CPT

## 2023-12-27 PROCEDURE — G8484 FLU IMMUNIZE NO ADMIN: HCPCS

## 2023-12-27 PROCEDURE — 3079F DIAST BP 80-89 MM HG: CPT

## 2023-12-27 PROCEDURE — 83036 HEMOGLOBIN GLYCOSYLATED A1C: CPT

## 2023-12-27 PROCEDURE — 99214 OFFICE O/P EST MOD 30 MIN: CPT

## 2023-12-27 PROCEDURE — 99212 OFFICE O/P EST SF 10 MIN: CPT

## 2023-12-27 PROCEDURE — 3017F COLORECTAL CA SCREEN DOC REV: CPT

## 2023-12-27 PROCEDURE — 1036F TOBACCO NON-USER: CPT

## 2023-12-27 PROCEDURE — G8427 DOCREV CUR MEDS BY ELIG CLIN: HCPCS

## 2023-12-27 PROCEDURE — 3051F HG A1C>EQUAL 7.0%<8.0%: CPT

## 2023-12-27 PROCEDURE — G8417 CALC BMI ABV UP PARAM F/U: HCPCS

## 2023-12-27 PROCEDURE — 3075F SYST BP GE 130 - 139MM HG: CPT

## 2023-12-27 PROCEDURE — PBSHW POCT GLYCOSYLATED HEMOGLOBIN (HGB A1C)

## 2023-12-27 RX ORDER — LANCETS 33 GAUGE
EACH MISCELLANEOUS
Qty: 100 EACH | Refills: 0 | OUTPATIENT
Start: 2023-12-27

## 2023-12-27 RX ORDER — PREDNISONE 5 MG/1
5 TABLET ORAL DAILY
Qty: 90 TABLET | Refills: 0 | Status: SHIPPED | OUTPATIENT
Start: 2023-12-27

## 2023-12-27 RX ORDER — TAMSULOSIN HYDROCHLORIDE 0.4 MG/1
0.4 CAPSULE ORAL DAILY
Qty: 90 CAPSULE | Refills: 0 | Status: SHIPPED | OUTPATIENT
Start: 2023-12-27

## 2023-12-27 RX ORDER — LANCETS 30 GAUGE
1 EACH MISCELLANEOUS DAILY
Qty: 100 EACH | Refills: 1 | Status: SHIPPED | OUTPATIENT
Start: 2023-12-27

## 2023-12-27 NOTE — PROGRESS NOTES
No posterior oropharyngeal erythema. Eyes:      Pupils: Pupils are equal, round, and reactive to light. Cardiovascular:      Rate and Rhythm: Normal rate and regular rhythm. Pulses: Normal pulses. Heart sounds: Normal heart sounds. Pulmonary:      Effort: Pulmonary effort is normal.      Breath sounds: Normal breath sounds. No wheezing or rhonchi. Abdominal:      General: Bowel sounds are normal.      Palpations: There is no mass. Tenderness: There is no abdominal tenderness. Musculoskeletal:         General: No swelling or tenderness. Normal range of motion. Cervical back: Normal range of motion. No rigidity or tenderness. Skin:     General: Skin is warm and dry. Capillary Refill: Capillary refill takes less than 2 seconds. Coloration: Skin is not pale. Findings: No erythema. Neurological:      General: No focal deficit present. Mental Status: He is alert and oriented to person, place, and time. Psychiatric:         Mood and Affect: Mood normal.         Behavior: Behavior normal.         Thought Content: Thought content normal.         Judgment: Judgment normal.                  An electronic signature was used to authenticate this note.     --MARY Hutchinson - CNP

## 2023-12-28 NOTE — ASSESSMENT & PLAN NOTE
At goal, changes made today: Did decrease metformin down to 1 500 mg tablets 1 tablet mouth daily due to kidney function. A1c is slightly elevated today, we will continue to monitor this moving forward due to change of metformin dosing. May need to add SGLT coverage to help with kidney function however diabetes is well-managed up to this point despite reduction in kidney function nephrology to see in the next month.

## 2024-02-15 DIAGNOSIS — I10 ESSENTIAL HYPERTENSION: ICD-10-CM

## 2024-02-15 RX ORDER — LISINOPRIL 10 MG/1
TABLET ORAL
Qty: 90 TABLET | Refills: 0 | Status: SHIPPED | OUTPATIENT
Start: 2024-02-15

## 2024-02-15 NOTE — TELEPHONE ENCOUNTER
Giovanny Camacho is requesting a refill on the following medication(s):  Requested Prescriptions     Pending Prescriptions Disp Refills    lisinopril (PRINIVIL;ZESTRIL) 10 MG tablet [Pharmacy Med Name: Lisinopril 10 MG Oral Tablet] 90 tablet 0     Sig: Take 1 tablet by mouth once daily       Last Visit Date (If Applicable):  12/27/2023    Next Visit Date:    Visit date not found

## 2024-02-22 DIAGNOSIS — E78.5 HYPERLIPIDEMIA, UNSPECIFIED HYPERLIPIDEMIA TYPE: Primary | ICD-10-CM

## 2024-02-22 DIAGNOSIS — E11.9 TYPE 2 DIABETES MELLITUS WITHOUT COMPLICATION, WITHOUT LONG-TERM CURRENT USE OF INSULIN (HCC): ICD-10-CM

## 2024-02-23 RX ORDER — ROSUVASTATIN CALCIUM 40 MG/1
TABLET, COATED ORAL
Qty: 90 TABLET | Refills: 0 | Status: SHIPPED | OUTPATIENT
Start: 2024-02-23

## 2024-02-23 NOTE — TELEPHONE ENCOUNTER
Giovanny Camacho is calling to request a refill on the following medication(s):  Requested Prescriptions     Pending Prescriptions Disp Refills    metFORMIN (GLUCOPHAGE) 500 MG tablet [Pharmacy Med Name: metFORMIN HCl 500 MG Oral Tablet] 360 tablet 0     Sig: TAKE 2 TABLETS BY MOUTH TWICE DAILY WITH MEALS    rosuvastatin (CRESTOR) 40 MG tablet [Pharmacy Med Name: Rosuvastatin Calcium 40 MG Oral Tablet] 90 tablet 0     Sig: TAKE 1 TABLET BY MOUTH ONCE DAILY IN THE EVENING       Last Visit Date (If Applicable):  12/27/2023    Next Visit Date:    Visit date not found

## 2024-03-02 ENCOUNTER — PATIENT MESSAGE (OUTPATIENT)
Dept: FAMILY MEDICINE CLINIC | Age: 69
End: 2024-03-02

## 2024-03-02 DIAGNOSIS — E11.9 TYPE 2 DIABETES MELLITUS WITHOUT COMPLICATION, WITHOUT LONG-TERM CURRENT USE OF INSULIN (HCC): ICD-10-CM

## 2024-03-04 NOTE — TELEPHONE ENCOUNTER
From: Giovanny Camacho  To: Jose Zamora  Sent: 3/2/2024 10:12 AM EST  Subject: Please adjust my Metformin to 1 a day    metFORMIN 500 MG tablet    Commonly known as: GLUCOPHAGE  Learn more  TAKE 2 TABLETS BY MOUTH TWICE DAILY WITH MEALS    This is the current prescription but you changed it to one per day. My appointment with the Nephrologist is Mar 11 and would like my record up to date.     Thank You  Desmond

## 2024-03-06 DIAGNOSIS — E03.9 ACQUIRED HYPOTHYROIDISM: ICD-10-CM

## 2024-03-06 RX ORDER — LEVOTHYROXINE SODIUM 137 UG/1
137 TABLET ORAL DAILY
Qty: 90 TABLET | Refills: 0 | Status: SHIPPED | OUTPATIENT
Start: 2024-03-06

## 2024-03-06 NOTE — TELEPHONE ENCOUNTER
Giovanny Camacho is requesting a refill on the following medication(s):  Requested Prescriptions     Pending Prescriptions Disp Refills    levothyroxine (SYNTHROID) 137 MCG tablet [Pharmacy Med Name: Levothyroxine Sodium 137 MCG Oral Tablet] 90 tablet 0     Sig: Take 1 tablet by mouth once daily       Last Visit Date (If Applicable):  12/27/2023    Next Visit Date:    Visit date not found

## 2024-03-11 PROBLEM — N18.32 STAGE 3B CHRONIC KIDNEY DISEASE (HCC): Status: ACTIVE | Noted: 2023-12-27

## 2024-03-20 DIAGNOSIS — R35.1 BPH ASSOCIATED WITH NOCTURIA: ICD-10-CM

## 2024-03-20 DIAGNOSIS — N40.1 BPH ASSOCIATED WITH NOCTURIA: ICD-10-CM

## 2024-03-20 RX ORDER — TAMSULOSIN HYDROCHLORIDE 0.4 MG/1
0.4 CAPSULE ORAL DAILY
Qty: 90 CAPSULE | Refills: 0 | Status: SHIPPED | OUTPATIENT
Start: 2024-03-20

## 2024-03-20 NOTE — TELEPHONE ENCOUNTER
Giovanny Camacho is requesting a refill on the following medication(s):  Requested Prescriptions     Pending Prescriptions Disp Refills    tamsulosin (FLOMAX) 0.4 MG capsule [Pharmacy Med Name: Tamsulosin HCl 0.4 MG Oral Capsule] 90 capsule 0     Sig: Take 1 capsule by mouth once daily       Last Visit Date (If Applicable):  12/27/2023    Next Visit Date:    Visit date not found

## 2024-03-28 RX ORDER — PREDNISONE 5 MG/1
5 TABLET ORAL DAILY
Qty: 90 TABLET | Refills: 0 | Status: SHIPPED | OUTPATIENT
Start: 2024-03-28

## 2024-03-28 NOTE — TELEPHONE ENCOUNTER
Giovanny Camacho is requesting a refill on the following medication(s):  Requested Prescriptions     Pending Prescriptions Disp Refills    predniSONE (DELTASONE) 5 MG tablet 90 tablet 0     Sig: Take 1 tablet by mouth daily       Last Visit Date (If Applicable):  12/27/2023    Next Visit Date:    Visit date not found

## 2024-04-03 ENCOUNTER — OFFICE VISIT (OUTPATIENT)
Dept: FAMILY MEDICINE CLINIC | Age: 69
End: 2024-04-03
Payer: MEDICARE

## 2024-04-03 VITALS
OXYGEN SATURATION: 97 % | BODY MASS INDEX: 32.77 KG/M2 | HEART RATE: 81 BPM | WEIGHT: 203 LBS | DIASTOLIC BLOOD PRESSURE: 74 MMHG | SYSTOLIC BLOOD PRESSURE: 118 MMHG

## 2024-04-03 DIAGNOSIS — E11.9 TYPE 2 DIABETES MELLITUS WITHOUT COMPLICATION, WITHOUT LONG-TERM CURRENT USE OF INSULIN (HCC): Primary | ICD-10-CM

## 2024-04-03 DIAGNOSIS — Z87.891 PERSONAL HISTORY OF TOBACCO USE: ICD-10-CM

## 2024-04-03 LAB — HBA1C MFR BLD: 7.9 %

## 2024-04-03 PROCEDURE — G8417 CALC BMI ABV UP PARAM F/U: HCPCS

## 2024-04-03 PROCEDURE — 3074F SYST BP LT 130 MM HG: CPT

## 2024-04-03 PROCEDURE — PBSHW POCT GLYCOSYLATED HEMOGLOBIN (HGB A1C)

## 2024-04-03 PROCEDURE — 83036 HEMOGLOBIN GLYCOSYLATED A1C: CPT

## 2024-04-03 PROCEDURE — 2022F DILAT RTA XM EVC RTNOPTHY: CPT

## 2024-04-03 PROCEDURE — G8427 DOCREV CUR MEDS BY ELIG CLIN: HCPCS

## 2024-04-03 PROCEDURE — 3017F COLORECTAL CA SCREEN DOC REV: CPT

## 2024-04-03 PROCEDURE — 1036F TOBACCO NON-USER: CPT

## 2024-04-03 PROCEDURE — 3078F DIAST BP <80 MM HG: CPT

## 2024-04-03 PROCEDURE — 99214 OFFICE O/P EST MOD 30 MIN: CPT

## 2024-04-03 PROCEDURE — 3051F HG A1C>EQUAL 7.0%<8.0%: CPT

## 2024-04-03 PROCEDURE — 1123F ACP DISCUSS/DSCN MKR DOCD: CPT

## 2024-04-03 PROCEDURE — 99212 OFFICE O/P EST SF 10 MIN: CPT

## 2024-04-03 PROCEDURE — G0296 VISIT TO DETERM LDCT ELIG: HCPCS

## 2024-04-03 ASSESSMENT — PATIENT HEALTH QUESTIONNAIRE - PHQ9
SUM OF ALL RESPONSES TO PHQ QUESTIONS 1-9: 0
1. LITTLE INTEREST OR PLEASURE IN DOING THINGS: NOT AT ALL
SUM OF ALL RESPONSES TO PHQ9 QUESTIONS 1 & 2: 0
SUM OF ALL RESPONSES TO PHQ QUESTIONS 1-9: 0
SUM OF ALL RESPONSES TO PHQ QUESTIONS 1-9: 0
2. FEELING DOWN, DEPRESSED OR HOPELESS: NOT AT ALL
SUM OF ALL RESPONSES TO PHQ QUESTIONS 1-9: 0

## 2024-04-03 NOTE — PROGRESS NOTES
Giovanny Camacho (:  1955) is a 69 y.o. male,Established patient, here for evaluation of the following chief complaint(s):  Diabetes (Patient is here today for a diabetes follow up. He was prescribed farxiga by nephrology but it is to expensive. )         ASSESSMENT/PLAN:  1. Type 2 diabetes mellitus without complication, without long-term current use of insulin ([facility])  Assessment & Plan:   Uncontrolled, changes made today: Start Jardiance 10 mg tablets 1 tablet by mouth daily.  Side effects and risk for foreigners gangrene is discussed with this.  He verbalized understanding this and will monitor side effects and let office know right away.  Also encourage diet lower in carbohydrates, exercise 30 minutes a day with a goal 150 minutes a week.  Orders:  -      DIABETES FOOT EXAM  -     POCT Hb A1C (glycosylated hemoglobin)  -     empagliflozin (JARDIANCE) 10 MG tablet; Take 1 tablet by mouth daily, Disp-30 tablet, R-5Normal  2. Personal history of tobacco use  -     [place] VISIT TO DISCUSS LUNG CA SCREEN W LDCT  -     CT Lung Screen (Initial/Annual/Baseline); Future      Return in about 3 months (around 7/3/2024), or if symptoms worsen or fail to improve.         Subjective   SUBJECTIVE/OBJECTIVE:  Diabetes  He presents for his follow-up diabetic visit. He has type 2 diabetes mellitus. His disease course has been worsening. Pertinent negatives for hypoglycemia include no confusion, dizziness, headaches or nervousness/anxiousness. Pertinent negatives for diabetes include no chest pain, no fatigue, no polydipsia, no polyphagia and no weakness. There are no hypoglycemic complications. Symptoms are stable. Risk factors: On daily dose of steroids for polymyalgia Rhemumatica. Current diabetic treatment includes oral agent (monotherapy). He is compliant with treatment most of the time.       Review of Systems   Constitutional:  Negative for chills, fatigue, fever and unexpected weight change.   HENT:  Negative for

## 2024-04-03 NOTE — PROGRESS NOTES
Discussed with the patient the current USPSTF guidelines released March 9, 2021 for screening for lung cancer.    For adults aged 50 to 80 years who have a 20 pack-year smoking history and currently smoke or have quit within the past 15 years the grade B recommendation is to:  Screen for lung cancer with low-dose computed tomography (LDCT) every year.  Stop screening once a person has not smoked for 15 years or has a health problem that limits life expectancy or the ability to have lung surgery.    The patient  reports that he quit smoking about 3 years ago. His smoking use included cigarettes. He started smoking about 43 years ago. He has a 20.0 pack-year smoking history. He has never used smokeless tobacco.. Discussed with patient the risks and benefits of screening, including over-diagnosis, false positive rate, and total radiation exposure.  The patient currently exhibits no signs or symptoms suggestive of lung cancer.  Discussed with patient the importance of compliance with yearly annual lung cancer screenings and willingness to undergo diagnosis and treatment if screening scan is positive.  In addition, the patient was counseled regarding the importance of remaining smoke free and/or total smoking cessation.    Also reviewed the following if the patient has Medicare that as of February 10, 2022, Medicare only covers LDCT screening in patients aged 50-77 with at least a 20 pack-year smoking history who currently smoke or have quit in the last 15 years

## 2024-04-08 ENCOUNTER — TELEPHONE (OUTPATIENT)
Dept: FAMILY MEDICINE CLINIC | Age: 69
End: 2024-04-08

## 2024-04-08 NOTE — TELEPHONE ENCOUNTER
Gisela from [facility] radiology called requesting office visit note for prior auth for pt CT lung screen. Pt is being rescheduled due to note not being signed. Please sign note. Thank you!c

## 2024-04-09 ASSESSMENT — ENCOUNTER SYMPTOMS
SHORTNESS OF BREATH: 0
SINUS PAIN: 0
CONSTIPATION: 0
DIARRHEA: 0
SINUS PRESSURE: 0
RHINORRHEA: 0
EYE ITCHING: 0
WHEEZING: 0
COLOR CHANGE: 0
COUGH: 0
EYE DISCHARGE: 0
CHEST TIGHTNESS: 0
BACK PAIN: 0
ABDOMINAL PAIN: 0
NAUSEA: 0

## 2024-04-09 NOTE — ASSESSMENT & PLAN NOTE
Uncontrolled, changes made today: Start Jardiance 10 mg tablets 1 tablet by mouth daily.  Side effects and risk for foreigners gangrene is discussed with this.  He verbalized understanding this and will monitor side effects and let office know right away.  Also encourage diet lower in carbohydrates, exercise 30 minutes a day with a goal 150 minutes a week.

## 2024-05-17 DIAGNOSIS — I10 ESSENTIAL HYPERTENSION: ICD-10-CM

## 2024-05-17 NOTE — TELEPHONE ENCOUNTER
Giovanny Camacho is requesting a refill on the following medication(s):  Requested Prescriptions     Pending Prescriptions Disp Refills    lisinopril (PRINIVIL;ZESTRIL) 10 MG tablet [Pharmacy Med Name: Lisinopril 10 MG Oral Tablet] 90 tablet 0     Sig: Take 1 tablet by mouth once daily       Last Visit Date (If Applicable):  4/3/2024    Next Visit Date:    7/3/2024

## 2024-05-19 DIAGNOSIS — E78.5 HYPERLIPIDEMIA, UNSPECIFIED HYPERLIPIDEMIA TYPE: ICD-10-CM

## 2024-05-19 DIAGNOSIS — E11.9 TYPE 2 DIABETES MELLITUS WITHOUT COMPLICATION, WITHOUT LONG-TERM CURRENT USE OF INSULIN (HCC): ICD-10-CM

## 2024-05-20 ENCOUNTER — OFFICE VISIT (OUTPATIENT)
Dept: FAMILY MEDICINE CLINIC | Age: 69
End: 2024-05-20
Payer: MEDICARE

## 2024-05-20 VITALS
SYSTOLIC BLOOD PRESSURE: 138 MMHG | BODY MASS INDEX: 33.41 KG/M2 | DIASTOLIC BLOOD PRESSURE: 84 MMHG | HEART RATE: 101 BPM | OXYGEN SATURATION: 95 % | WEIGHT: 207 LBS

## 2024-05-20 DIAGNOSIS — S81.811A: Primary | ICD-10-CM

## 2024-05-20 PROCEDURE — 1036F TOBACCO NON-USER: CPT

## 2024-05-20 PROCEDURE — 99212 OFFICE O/P EST SF 10 MIN: CPT

## 2024-05-20 PROCEDURE — 3017F COLORECTAL CA SCREEN DOC REV: CPT

## 2024-05-20 PROCEDURE — G8417 CALC BMI ABV UP PARAM F/U: HCPCS

## 2024-05-20 PROCEDURE — G8427 DOCREV CUR MEDS BY ELIG CLIN: HCPCS

## 2024-05-20 PROCEDURE — 3079F DIAST BP 80-89 MM HG: CPT

## 2024-05-20 PROCEDURE — 3075F SYST BP GE 130 - 139MM HG: CPT

## 2024-05-20 PROCEDURE — 1123F ACP DISCUSS/DSCN MKR DOCD: CPT

## 2024-05-20 RX ORDER — LISINOPRIL 10 MG/1
TABLET ORAL
Qty: 90 TABLET | Refills: 0 | Status: SHIPPED | OUTPATIENT
Start: 2024-05-20

## 2024-05-20 RX ORDER — ROSUVASTATIN CALCIUM 40 MG/1
TABLET, COATED ORAL
Qty: 90 TABLET | Refills: 0 | Status: SHIPPED | OUTPATIENT
Start: 2024-05-20

## 2024-05-20 ASSESSMENT — ENCOUNTER SYMPTOMS
COUGH: 0
EYE ITCHING: 0
COLOR CHANGE: 0
RHINORRHEA: 0
NAUSEA: 0
SHORTNESS OF BREATH: 0
DIARRHEA: 0
WHEEZING: 0
BACK PAIN: 0
EYE DISCHARGE: 0
CHEST TIGHTNESS: 0
CONSTIPATION: 0
ABDOMINAL PAIN: 0
SINUS PAIN: 0
SINUS PRESSURE: 0

## 2024-05-20 NOTE — PROGRESS NOTES
4/30/2022     3:26 PM 4/18/2022     8:39 AM 10/21/2021    11:19 AM 3/24/2021     1:01 PM   PHQ Scores   PHQ2 Score 0 0 0 0 0 0 0   PHQ9 Score 0 0 0 0 0 0 0     Interpretation of Total Score Depression Severity: 1-4 = Minimal depression, 5-9 = Mild depression, 10-14 = Moderate depression, 15-19 = Moderately severe depression, 20-27 = Severe depression     Objective:     Vitals:    05/20/24 0832   BP: 138/84   Site: Right Upper Arm   Position: Sitting   Pulse: (!) 101   SpO2: 95%   Weight: 93.9 kg (207 lb)        Physical Exam  Vitals and nursing note reviewed.   Constitutional:       General: He is not in acute distress.     Appearance: Normal appearance. He is not ill-appearing.   HENT:      Head: Normocephalic and atraumatic.      Right Ear: Tympanic membrane and external ear normal.      Left Ear: Tympanic membrane and external ear normal.      Nose: Nose normal. No congestion or rhinorrhea.      Mouth/Throat:      Mouth: Mucous membranes are moist.      Pharynx: Oropharynx is clear. No posterior oropharyngeal erythema.   Eyes:      Pupils: Pupils are equal, round, and reactive to light.   Cardiovascular:      Rate and Rhythm: Normal rate and regular rhythm.      Pulses: Normal pulses.      Heart sounds: Normal heart sounds.   Pulmonary:      Effort: Pulmonary effort is normal.      Breath sounds: Normal breath sounds. No wheezing or rhonchi.   Abdominal:      General: Bowel sounds are normal.      Palpations: There is no mass.      Tenderness: There is no abdominal tenderness.   Musculoskeletal:         General: No swelling or tenderness. Normal range of motion.      Cervical back: Normal range of motion. No rigidity or tenderness.        Legs:       Comments: Horizontal laceration approximately 5 cm's long, very small open area to initial area of laceration otherwise edges are approximated.   Skin:     General: Skin is warm and dry.      Capillary Refill: Capillary refill takes less than 2 seconds.

## 2024-05-20 NOTE — TELEPHONE ENCOUNTER
Giovanny Camacho is requesting a refill on the following medication(s):  Requested Prescriptions     Pending Prescriptions Disp Refills    rosuvastatin (CRESTOR) 40 MG tablet [Pharmacy Med Name: Rosuvastatin Calcium 40 MG Oral Tablet] 90 tablet 0     Sig: TAKE 1 TABLET BY MOUTH ONCE DAILY IN THE EVENING       Last Visit Date (If Applicable):  4/3/2024    Next Visit Date:    7/3/2024

## 2024-06-03 DIAGNOSIS — E03.9 ACQUIRED HYPOTHYROIDISM: ICD-10-CM

## 2024-06-03 RX ORDER — LEVOTHYROXINE SODIUM 137 UG/1
137 TABLET ORAL DAILY
Qty: 90 TABLET | Refills: 0 | Status: SHIPPED | OUTPATIENT
Start: 2024-06-03

## 2024-06-03 NOTE — TELEPHONE ENCOUNTER
Giovanny Camacho is requesting a refill on the following medication(s):  Requested Prescriptions     Pending Prescriptions Disp Refills    levothyroxine (SYNTHROID) 137 MCG tablet [Pharmacy Med Name: Levothyroxine Sodium 137 MCG Oral Tablet] 90 tablet 0     Sig: Take 1 tablet by mouth once daily       Last Visit Date (If Applicable):  5/20/2024    Next Visit Date:    7/3/2024

## 2024-06-21 DIAGNOSIS — R35.1 BPH ASSOCIATED WITH NOCTURIA: ICD-10-CM

## 2024-06-21 DIAGNOSIS — N40.1 BPH ASSOCIATED WITH NOCTURIA: ICD-10-CM

## 2024-06-21 NOTE — TELEPHONE ENCOUNTER
Giovanny Camacho is requesting a refill on the following medication(s):  Requested Prescriptions     Pending Prescriptions Disp Refills    tamsulosin (FLOMAX) 0.4 MG capsule [Pharmacy Med Name: Tamsulosin HCl 0.4 MG Oral Capsule] 90 capsule 0     Sig: Take 1 capsule by mouth once daily       Last Visit Date (If Applicable):  5/20/2024    Next Visit Date:    7/3/2024

## 2024-06-23 RX ORDER — TAMSULOSIN HYDROCHLORIDE 0.4 MG/1
0.4 CAPSULE ORAL DAILY
Qty: 90 CAPSULE | Refills: 0 | Status: SHIPPED | OUTPATIENT
Start: 2024-06-23

## 2024-07-01 RX ORDER — PREDNISONE 5 MG/1
5 TABLET ORAL DAILY
Qty: 90 TABLET | Refills: 0 | Status: SHIPPED | OUTPATIENT
Start: 2024-07-01

## 2024-07-01 NOTE — TELEPHONE ENCOUNTER
Giovanny Camacho is requesting a refill on the following medication(s):  Requested Prescriptions     Pending Prescriptions Disp Refills    predniSONE (DELTASONE) 5 MG tablet 90 tablet 0     Sig: Take 1 tablet by mouth daily       Last Visit Date (If Applicable):  5/20/2024    Next Visit Date:    7/3/2024

## 2024-07-02 SDOH — ECONOMIC STABILITY: FOOD INSECURITY: WITHIN THE PAST 12 MONTHS, THE FOOD YOU BOUGHT JUST DIDN'T LAST AND YOU DIDN'T HAVE MONEY TO GET MORE.: NEVER TRUE

## 2024-07-02 SDOH — ECONOMIC STABILITY: INCOME INSECURITY: HOW HARD IS IT FOR YOU TO PAY FOR THE VERY BASICS LIKE FOOD, HOUSING, MEDICAL CARE, AND HEATING?: NOT VERY HARD

## 2024-07-02 SDOH — ECONOMIC STABILITY: FOOD INSECURITY: WITHIN THE PAST 12 MONTHS, YOU WORRIED THAT YOUR FOOD WOULD RUN OUT BEFORE YOU GOT MONEY TO BUY MORE.: NEVER TRUE

## 2024-07-02 SDOH — ECONOMIC STABILITY: TRANSPORTATION INSECURITY
IN THE PAST 12 MONTHS, HAS LACK OF TRANSPORTATION KEPT YOU FROM MEETINGS, WORK, OR FROM GETTING THINGS NEEDED FOR DAILY LIVING?: NO

## 2024-07-03 ENCOUNTER — OFFICE VISIT (OUTPATIENT)
Dept: FAMILY MEDICINE CLINIC | Age: 69
End: 2024-07-03
Payer: MEDICARE

## 2024-07-03 VITALS
BODY MASS INDEX: 33.43 KG/M2 | SYSTOLIC BLOOD PRESSURE: 118 MMHG | HEART RATE: 101 BPM | HEIGHT: 66 IN | OXYGEN SATURATION: 95 % | DIASTOLIC BLOOD PRESSURE: 82 MMHG | WEIGHT: 208 LBS

## 2024-07-03 DIAGNOSIS — I73.9 PERIPHERAL VASCULAR DISEASE (HCC): ICD-10-CM

## 2024-07-03 DIAGNOSIS — E11.9 TYPE 2 DIABETES MELLITUS WITHOUT COMPLICATION, WITHOUT LONG-TERM CURRENT USE OF INSULIN (HCC): Primary | ICD-10-CM

## 2024-07-03 DIAGNOSIS — M35.3 POLYMYALGIA RHEUMATICA (HCC): ICD-10-CM

## 2024-07-03 LAB — HBA1C MFR BLD: 7.5 %

## 2024-07-03 PROCEDURE — 83036 HEMOGLOBIN GLYCOSYLATED A1C: CPT

## 2024-07-03 PROCEDURE — 1036F TOBACCO NON-USER: CPT

## 2024-07-03 PROCEDURE — 2022F DILAT RTA XM EVC RTNOPTHY: CPT

## 2024-07-03 PROCEDURE — 3074F SYST BP LT 130 MM HG: CPT

## 2024-07-03 PROCEDURE — G8427 DOCREV CUR MEDS BY ELIG CLIN: HCPCS

## 2024-07-03 PROCEDURE — PBSHW POCT GLYCOSYLATED HEMOGLOBIN (HGB A1C)

## 2024-07-03 PROCEDURE — 1123F ACP DISCUSS/DSCN MKR DOCD: CPT

## 2024-07-03 PROCEDURE — G8417 CALC BMI ABV UP PARAM F/U: HCPCS

## 2024-07-03 PROCEDURE — 99212 OFFICE O/P EST SF 10 MIN: CPT

## 2024-07-03 PROCEDURE — 3017F COLORECTAL CA SCREEN DOC REV: CPT

## 2024-07-03 PROCEDURE — 99214 OFFICE O/P EST MOD 30 MIN: CPT

## 2024-07-03 PROCEDURE — 3079F DIAST BP 80-89 MM HG: CPT

## 2024-07-03 PROCEDURE — 3051F HG A1C>EQUAL 7.0%<8.0%: CPT

## 2024-07-03 RX ORDER — GLIPIZIDE 2.5 MG/1
2.5 TABLET, EXTENDED RELEASE ORAL DAILY
Qty: 90 TABLET | Refills: 0 | Status: SHIPPED | OUTPATIENT
Start: 2024-07-03

## 2024-07-03 NOTE — PROGRESS NOTES
Giovanny Camacho (:  1955) is a 69 y.o. male,Established patient, here for evaluation of the following chief complaint(s):  Hypertension (Pt is here for a 3 month f/u. Pt sttaes that the flomax is giving him trouble with controlling his urination. )      Assessment & Plan   1. Type 2 diabetes mellitus without complication, without long-term current use of insulin (HCC)  Assessment & Plan:   Uncontrolled, changes made today: Start glipizide 2.5 mg tablets 1 tablet by mouth daily.  Monitor for signs and symptoms of hypoglycemia, monitor blood pressure 3 times daily.  Other side effects with this medication are discussed.  A1c still above goal range however it did show small amount of improvement.  Will recheck in 3 months.  Orders:  -     POCT Hb A1C (glycosylated hemoglobin)  -     glipiZIDE (GLUCOTROL XL) 2.5 MG extended release tablet; Take 1 tablet by mouth daily, Disp-90 tablet, R-0Normal  2. Polymyalgia rheumatica (HCC)  Assessment & Plan:   Monitored by specialist- no acute findings meriting change in the plan  3. Peripheral vascular disease (HCC)  Assessment & Plan:   At goal, continue current treatment plan      No follow-ups on file.       Subjective   Diabetes  He has type 2 diabetes mellitus. His disease course has been improving. Pertinent negatives for hypoglycemia include no confusion, dizziness or nervousness/anxiousness. Pertinent negatives for diabetes include no fatigue, no polydipsia, no polyphagia and no weakness. There are no hypoglycemic complications. Symptoms are stable. Current diabetic treatment includes oral agent (dual therapy) and oral agent (monotherapy). He is compliant with treatment most of the time. An ACE inhibitor/angiotensin II receptor blocker is being taken.       Review of Systems   Constitutional:  Negative for chills, fatigue, fever and unexpected weight change.   HENT:  Negative for congestion, ear pain, rhinorrhea, sinus pressure and sinus pain.    Eyes:  Negative

## 2024-07-08 NOTE — ASSESSMENT & PLAN NOTE
Uncontrolled, changes made today: Start glipizide 2.5 mg tablets 1 tablet by mouth daily.  Monitor for signs and symptoms of hypoglycemia, monitor blood pressure 3 times daily.  Other side effects with this medication are discussed.  A1c still above goal range however it did show small amount of improvement.  Will recheck in 3 months.

## 2024-08-05 DIAGNOSIS — E11.9 TYPE 2 DIABETES MELLITUS WITHOUT COMPLICATION, WITHOUT LONG-TERM CURRENT USE OF INSULIN (HCC): ICD-10-CM

## 2024-08-05 RX ORDER — LANCETS 30 GAUGE
1 EACH MISCELLANEOUS DAILY
Qty: 100 EACH | Refills: 1 | Status: SHIPPED | OUTPATIENT
Start: 2024-08-05

## 2024-08-05 SDOH — HEALTH STABILITY: PHYSICAL HEALTH: ON AVERAGE, HOW MANY MINUTES DO YOU ENGAGE IN EXERCISE AT THIS LEVEL?: 40 MIN

## 2024-08-05 SDOH — HEALTH STABILITY: PHYSICAL HEALTH: ON AVERAGE, HOW MANY DAYS PER WEEK DO YOU ENGAGE IN MODERATE TO STRENUOUS EXERCISE (LIKE A BRISK WALK)?: 3 DAYS

## 2024-08-05 ASSESSMENT — LIFESTYLE VARIABLES
HAS A RELATIVE, FRIEND, DOCTOR, OR ANOTHER HEALTH PROFESSIONAL EXPRESSED CONCERN ABOUT YOUR DRINKING OR SUGGESTED YOU CUT DOWN: NO
HAVE YOU OR SOMEONE ELSE BEEN INJURED AS A RESULT OF YOUR DRINKING: NO
HOW MANY STANDARD DRINKS CONTAINING ALCOHOL DO YOU HAVE ON A TYPICAL DAY: 3 OR 4
HOW OFTEN DURING THE LAST YEAR HAVE YOU NEEDED AN ALCOHOLIC DRINK FIRST THING IN THE MORNING TO GET YOURSELF GOING AFTER A NIGHT OF HEAVY DRINKING: NEVER
HOW OFTEN DURING THE LAST YEAR HAVE YOU FAILED TO DO WHAT WAS NORMALLY EXPECTED FROM YOU BECAUSE OF DRINKING: NEVER
HOW OFTEN DO YOU HAVE SIX OR MORE DRINKS ON ONE OCCASION: 1
HOW OFTEN DURING THE LAST YEAR HAVE YOU NEEDED AN ALCOHOLIC DRINK FIRST THING IN THE MORNING TO GET YOURSELF GOING AFTER A NIGHT OF HEAVY DRINKING: NEVER
HOW OFTEN DO YOU HAVE A DRINK CONTAINING ALCOHOL: 4 OR MORE TIMES A WEEK
HOW OFTEN DURING THE LAST YEAR HAVE YOU FOUND THAT YOU WERE NOT ABLE TO STOP DRINKING ONCE YOU HAD STARTED: NEVER
HOW OFTEN DURING THE LAST YEAR HAVE YOU FOUND THAT YOU WERE NOT ABLE TO STOP DRINKING ONCE YOU HAD STARTED: NEVER
HAVE YOU OR SOMEONE ELSE BEEN INJURED AS A RESULT OF YOUR DRINKING: NO
HOW OFTEN DURING THE LAST YEAR HAVE YOU HAD A FEELING OF GUILT OR REMORSE AFTER DRINKING: NEVER
HOW OFTEN DURING THE LAST YEAR HAVE YOU HAD A FEELING OF GUILT OR REMORSE AFTER DRINKING: NEVER
HOW OFTEN DURING THE LAST YEAR HAVE YOU BEEN UNABLE TO REMEMBER WHAT HAPPENED THE NIGHT BEFORE BECAUSE YOU HAD BEEN DRINKING: NEVER
HAS A RELATIVE, FRIEND, DOCTOR, OR ANOTHER HEALTH PROFESSIONAL EXPRESSED CONCERN ABOUT YOUR DRINKING OR SUGGESTED YOU CUT DOWN: NO
HOW MANY STANDARD DRINKS CONTAINING ALCOHOL DO YOU HAVE ON A TYPICAL DAY: 2
HOW OFTEN DURING THE LAST YEAR HAVE YOU FAILED TO DO WHAT WAS NORMALLY EXPECTED FROM YOU BECAUSE OF DRINKING: NEVER
HOW OFTEN DURING THE LAST YEAR HAVE YOU BEEN UNABLE TO REMEMBER WHAT HAPPENED THE NIGHT BEFORE BECAUSE YOU HAD BEEN DRINKING: NEVER
HOW OFTEN DO YOU HAVE A DRINK CONTAINING ALCOHOL: 5

## 2024-08-05 ASSESSMENT — PATIENT HEALTH QUESTIONNAIRE - PHQ9
1. LITTLE INTEREST OR PLEASURE IN DOING THINGS: NOT AT ALL
SUM OF ALL RESPONSES TO PHQ QUESTIONS 1-9: 1
SUM OF ALL RESPONSES TO PHQ QUESTIONS 1-9: 1
SUM OF ALL RESPONSES TO PHQ9 QUESTIONS 1 & 2: 1
2. FEELING DOWN, DEPRESSED OR HOPELESS: SEVERAL DAYS
SUM OF ALL RESPONSES TO PHQ QUESTIONS 1-9: 1
SUM OF ALL RESPONSES TO PHQ QUESTIONS 1-9: 1

## 2024-08-05 NOTE — TELEPHONE ENCOUNTER
Giovanny Camacho is requesting a refill on the following medication(s):  Requested Prescriptions     Pending Prescriptions Disp Refills    Lancets MISC 100 each 1     Si each by Does not apply route daily       Last Visit Date (If Applicable):  7/3/2024    Next Visit Date:    2024

## 2024-08-13 ENCOUNTER — OFFICE VISIT (OUTPATIENT)
Dept: FAMILY MEDICINE CLINIC | Age: 69
End: 2024-08-13
Payer: MEDICARE

## 2024-08-13 VITALS
SYSTOLIC BLOOD PRESSURE: 116 MMHG | HEIGHT: 66 IN | DIASTOLIC BLOOD PRESSURE: 60 MMHG | OXYGEN SATURATION: 96 % | HEART RATE: 105 BPM | WEIGHT: 210.8 LBS | BODY MASS INDEX: 33.88 KG/M2

## 2024-08-13 DIAGNOSIS — E11.22 TYPE 2 DIABETES MELLITUS WITH CHRONIC KIDNEY DISEASE, WITHOUT LONG-TERM CURRENT USE OF INSULIN, UNSPECIFIED CKD STAGE (HCC): ICD-10-CM

## 2024-08-13 DIAGNOSIS — Z13.220 LIPID SCREENING: ICD-10-CM

## 2024-08-13 DIAGNOSIS — Z12.5 PROSTATE CANCER SCREENING: ICD-10-CM

## 2024-08-13 DIAGNOSIS — Z00.00 MEDICARE ANNUAL WELLNESS VISIT, SUBSEQUENT: Primary | ICD-10-CM

## 2024-08-13 DIAGNOSIS — E11.9 TYPE 2 DIABETES MELLITUS WITHOUT COMPLICATION, WITHOUT LONG-TERM CURRENT USE OF INSULIN (HCC): ICD-10-CM

## 2024-08-13 PROCEDURE — 99211 OFF/OP EST MAY X REQ PHY/QHP: CPT

## 2024-08-13 PROCEDURE — 1123F ACP DISCUSS/DSCN MKR DOCD: CPT

## 2024-08-13 PROCEDURE — G0439 PPPS, SUBSEQ VISIT: HCPCS

## 2024-08-13 PROCEDURE — 3017F COLORECTAL CA SCREEN DOC REV: CPT

## 2024-08-13 PROCEDURE — 3051F HG A1C>EQUAL 7.0%<8.0%: CPT

## 2024-08-13 PROCEDURE — 3078F DIAST BP <80 MM HG: CPT

## 2024-08-13 PROCEDURE — 3074F SYST BP LT 130 MM HG: CPT

## 2024-08-13 NOTE — PROGRESS NOTES
Medicare Annual Wellness Visit    Giovanny Camacho is here for Medicare AWV (Pt is here for a Medicare AWV. )    Assessment & Plan   Medicare annual wellness visit, subsequent  Type 2 diabetes mellitus without complication, without long-term current use of insulin (HCC)  -     Hemoglobin A1C; Future  Type 2 diabetes mellitus with chronic kidney disease, without long-term current use of insulin, unspecified CKD stage (HCC)  Lipid screening  -     Lipid, Fasting; Future  Prostate cancer screening  -     PSA Screening; Future  Recommendations for Preventive Services Due: see orders and patient instructions/AVS.  Recommended screening schedule for the next 5-10 years is provided to the patient in written form: see Patient Instructions/AVS.     Return in about 6 months (around 2/13/2025), or if symptoms worsen or fail to improve.     Subjective       Patient's complete Health Risk Assessment and screening values have been reviewed and are found in Flowsheets. The following problems were reviewed today and where indicated follow up appointments were made and/or referrals ordered.    Positive Risk Factor Screenings with Interventions:    Fall Risk:  Do you feel unsteady or are you worried about falling? : no  2 or more falls in past year?: no  Fall with injury in past year?: (!) yes     Interventions:    Reviewed medications, home hazards, visual acuity, and co-morbidities that can increase risk for falls      Alcohol Screening:  AUDIT-C Score: 5  AUDIT Total Score: 5  Total Score Interpretation: 0-7 suggests low risk alcohol consumption but assess individual risks   Interventions:  Patient advised to follow up in the office for further evalution and treatment                Abnormal BMI (obese):  Body mass index is 34.02 kg/m². (!) Abnormal  Interventions:  Patient declines any further evaluation or treatment        Dentist Screen:  Have you seen the dentist within the past year?: (!) No    Intervention:  Advised to

## 2024-08-13 NOTE — PATIENT INSTRUCTIONS
sleep each night.     Limit alcohol to 2 drinks a day for men and 1 drink a day for women. Too much alcohol can cause health problems.     Manage other health problems such as diabetes, high blood pressure, and high cholesterol. If you think you may have a problem with alcohol or drug use, talk to your doctor.   Medicines    Take your medicines exactly as prescribed. Call your doctor if you think you are having a problem with your medicine.     If your doctor recommends aspirin, take the amount directed each day. Make sure you take aspirin and not another kind of pain reliever, such as acetaminophen (Tylenol).   When should you call for help?   Call 911 if you have symptoms of a heart attack. These may include:    Chest pain or pressure, or a strange feeling in the chest.     Sweating.     Shortness of breath.     Pain, pressure, or a strange feeling in the back, neck, jaw, or upper belly or in one or both shoulders or arms.     Lightheadedness or sudden weakness.     A fast or irregular heartbeat.   After you call 911, the  may tell you to chew 1 adult-strength or 2 to 4 low-dose aspirin. Wait for an ambulance. Do not try to drive yourself.  Watch closely for changes in your health, and be sure to contact your doctor if you have any problems.  Where can you learn more?  Go to https://www.[employer].net/patientEd and enter F075 to learn more about \"A Healthy Heart: Care Instructions.\"  Current as of: June 24, 2023  Content Version: 14.1  © 2006-2024 [employer].   Care instructions adapted under license by [employer]. If you have questions about a medical condition or this instruction, always ask your healthcare professional. [employer] disclaims any warranty or liability for your use of this information.      Personalized Preventive Plan for Giovanny Camacho - 8/13/2024  Medicare offers a range of preventive health benefits. Some of the tests and screenings are paid in full while

## 2024-08-14 DIAGNOSIS — I10 ESSENTIAL HYPERTENSION: ICD-10-CM

## 2024-08-14 RX ORDER — LISINOPRIL 10 MG/1
TABLET ORAL
Qty: 90 TABLET | Refills: 0 | Status: SHIPPED | OUTPATIENT
Start: 2024-08-14

## 2024-08-14 NOTE — TELEPHONE ENCOUNTER
Giovanny Camacho is requesting a refill on the following medication(s):  Requested Prescriptions     Pending Prescriptions Disp Refills    lisinopril (PRINIVIL;ZESTRIL) 10 MG tablet [Pharmacy Med Name: Lisinopril 10 MG Oral Tablet] 90 tablet 0     Sig: Take 1 tablet by mouth once daily       Last Visit Date (If Applicable):  8/13/2024    Next Visit Date:    Visit date not found

## 2024-08-17 DIAGNOSIS — E78.5 HYPERLIPIDEMIA, UNSPECIFIED HYPERLIPIDEMIA TYPE: ICD-10-CM

## 2024-08-17 DIAGNOSIS — E11.9 TYPE 2 DIABETES MELLITUS WITHOUT COMPLICATION, WITHOUT LONG-TERM CURRENT USE OF INSULIN (HCC): ICD-10-CM

## 2024-08-19 NOTE — TELEPHONE ENCOUNTER
Giovanny Camacho is requesting a refill on the following medication(s):  Requested Prescriptions     Pending Prescriptions Disp Refills    rosuvastatin (CRESTOR) 40 MG tablet [Pharmacy Med Name: Rosuvastatin Calcium 40 MG Oral Tablet] 90 tablet 0     Sig: TAKE 1 TABLET BY MOUTH ONCE DAILY IN THE EVENING       Last Visit Date (If Applicable):  8/13/2024    Next Visit Date:    2/13/2025

## 2024-08-20 RX ORDER — ROSUVASTATIN CALCIUM 40 MG/1
TABLET, COATED ORAL
Qty: 90 TABLET | Refills: 0 | Status: SHIPPED | OUTPATIENT
Start: 2024-08-20

## 2024-08-29 DIAGNOSIS — E03.9 ACQUIRED HYPOTHYROIDISM: ICD-10-CM

## 2024-08-29 RX ORDER — LEVOTHYROXINE SODIUM 137 UG/1
137 TABLET ORAL DAILY
Qty: 90 TABLET | Refills: 0 | Status: SHIPPED | OUTPATIENT
Start: 2024-08-29

## 2024-08-29 NOTE — TELEPHONE ENCOUNTER
Giovanny Camacho is requesting a refill on the following medication(s):  Requested Prescriptions     Pending Prescriptions Disp Refills    levothyroxine (SYNTHROID) 137 MCG tablet [Pharmacy Med Name: Levothyroxine Sodium 137 MCG Oral Tablet] 90 tablet 0     Sig: Take 1 tablet by mouth once daily       Last Visit Date (If Applicable):  8/13/2024    Next Visit Date:    2/13/2025

## 2024-09-20 DIAGNOSIS — R35.1 BPH ASSOCIATED WITH NOCTURIA: ICD-10-CM

## 2024-09-20 DIAGNOSIS — N40.1 BPH ASSOCIATED WITH NOCTURIA: ICD-10-CM

## 2024-09-23 RX ORDER — TAMSULOSIN HYDROCHLORIDE 0.4 MG/1
0.4 CAPSULE ORAL DAILY
Qty: 90 CAPSULE | Refills: 0 | Status: SHIPPED | OUTPATIENT
Start: 2024-09-23

## 2024-09-26 DIAGNOSIS — E11.9 TYPE 2 DIABETES MELLITUS WITHOUT COMPLICATION, WITHOUT LONG-TERM CURRENT USE OF INSULIN (HCC): ICD-10-CM

## 2024-09-26 RX ORDER — GLIPIZIDE 2.5 MG/1
2.5 TABLET, EXTENDED RELEASE ORAL DAILY
Qty: 90 TABLET | Refills: 0 | Status: SHIPPED | OUTPATIENT
Start: 2024-09-26

## 2024-10-16 RX ORDER — PREDNISONE 5 MG/1
5 TABLET ORAL DAILY
Qty: 90 TABLET | Refills: 0 | Status: SHIPPED | OUTPATIENT
Start: 2024-10-16

## 2024-10-16 NOTE — TELEPHONE ENCOUNTER
Giovanny Camacho is requesting a refill on the following medication(s):  Requested Prescriptions     Pending Prescriptions Disp Refills    predniSONE (DELTASONE) 5 MG tablet 90 tablet 0     Sig: Take 1 tablet by mouth daily       Last Visit Date (If Applicable):  8/13/2024    Next Visit Date:    2/13/2025

## 2024-10-18 DIAGNOSIS — E11.9 TYPE 2 DIABETES MELLITUS WITHOUT COMPLICATION, WITHOUT LONG-TERM CURRENT USE OF INSULIN (HCC): ICD-10-CM

## 2024-10-21 RX ORDER — EMPAGLIFLOZIN 25 MG/1
25 TABLET, FILM COATED ORAL DAILY
Qty: 90 TABLET | Refills: 0 | Status: SHIPPED | OUTPATIENT
Start: 2024-10-21

## 2024-10-21 NOTE — TELEPHONE ENCOUNTER
Giovanny Camacho is requesting a refill on the following medication(s):  Requested Prescriptions     Pending Prescriptions Disp Refills    JARDIANCE 25 MG tablet [Pharmacy Med Name: Jardiance 25 MG Oral Tablet] 90 tablet 0     Sig: Take 1 tablet by mouth once daily       Last Visit Date (If Applicable):  8/13/2024    Next Visit Date:    2/13/2025

## 2024-11-13 DIAGNOSIS — I10 ESSENTIAL HYPERTENSION: ICD-10-CM

## 2024-11-13 RX ORDER — LISINOPRIL 10 MG/1
TABLET ORAL
Qty: 90 TABLET | Refills: 2 | Status: SHIPPED | OUTPATIENT
Start: 2024-11-13

## 2024-11-18 DIAGNOSIS — E78.5 HYPERLIPIDEMIA, UNSPECIFIED HYPERLIPIDEMIA TYPE: ICD-10-CM

## 2024-11-18 DIAGNOSIS — E11.9 TYPE 2 DIABETES MELLITUS WITHOUT COMPLICATION, WITHOUT LONG-TERM CURRENT USE OF INSULIN (HCC): ICD-10-CM

## 2024-11-18 RX ORDER — ROSUVASTATIN CALCIUM 40 MG/1
TABLET, COATED ORAL
Qty: 90 TABLET | Refills: 2 | Status: SHIPPED | OUTPATIENT
Start: 2024-11-18

## 2024-11-27 DIAGNOSIS — E03.9 ACQUIRED HYPOTHYROIDISM: ICD-10-CM

## 2024-11-27 RX ORDER — LEVOTHYROXINE SODIUM 137 UG/1
137 TABLET ORAL DAILY
Qty: 90 TABLET | Refills: 2 | Status: SHIPPED | OUTPATIENT
Start: 2024-11-27

## 2024-12-20 DIAGNOSIS — R35.1 BPH ASSOCIATED WITH NOCTURIA: ICD-10-CM

## 2024-12-20 DIAGNOSIS — N40.1 BPH ASSOCIATED WITH NOCTURIA: ICD-10-CM

## 2024-12-23 RX ORDER — TAMSULOSIN HYDROCHLORIDE 0.4 MG/1
0.4 CAPSULE ORAL DAILY
Qty: 90 CAPSULE | Refills: 0 | Status: SHIPPED | OUTPATIENT
Start: 2024-12-23

## 2024-12-23 NOTE — TELEPHONE ENCOUNTER
Please refill medication       Giovanny Camacho is requesting a refill on the following medication(s):  Requested Prescriptions     Pending Prescriptions Disp Refills    tamsulosin (FLOMAX) 0.4 MG capsule [Pharmacy Med Name: Tamsulosin HCl 0.4 MG Oral Capsule] 90 capsule 0     Sig: Take 1 capsule by mouth once daily       Last Visit Date (If Applicable):  8/13/2024    Next Visit Date:    2/13/2025

## 2024-12-24 DIAGNOSIS — E11.9 TYPE 2 DIABETES MELLITUS WITHOUT COMPLICATION, WITHOUT LONG-TERM CURRENT USE OF INSULIN (HCC): ICD-10-CM

## 2024-12-27 NOTE — TELEPHONE ENCOUNTER
Giovanny Camacho is requesting a refill on the following medication(s):  Requested Prescriptions     Pending Prescriptions Disp Refills    glipiZIDE (GLUCOTROL XL) 2.5 MG extended release tablet [Pharmacy Med Name: glipiZIDE ER 2.5 MG Oral Tablet Extended Release 24 Hour] 90 tablet 0     Sig: Take 1 tablet by mouth once daily       Last Visit Date (If Applicable):  8/13/2024    Next Visit Date:    2/13/2025

## 2025-01-02 RX ORDER — GLIPIZIDE 2.5 MG/1
2.5 TABLET, EXTENDED RELEASE ORAL DAILY
Qty: 90 TABLET | Refills: 0 | Status: SHIPPED | OUTPATIENT
Start: 2025-01-02

## 2025-01-16 RX ORDER — PREDNISONE 5 MG/1
5 TABLET ORAL DAILY
Qty: 90 TABLET | Refills: 0 | Status: SHIPPED | OUTPATIENT
Start: 2025-01-16

## 2025-01-18 DIAGNOSIS — E11.9 TYPE 2 DIABETES MELLITUS WITHOUT COMPLICATION, WITHOUT LONG-TERM CURRENT USE OF INSULIN (HCC): ICD-10-CM

## 2025-01-20 RX ORDER — EMPAGLIFLOZIN 25 MG/1
25 TABLET, FILM COATED ORAL DAILY
Qty: 90 TABLET | Refills: 0 | Status: SHIPPED | OUTPATIENT
Start: 2025-01-20

## 2025-01-20 NOTE — TELEPHONE ENCOUNTER
Giovanny Camacho is requesting a refill on the following medication(s):  Requested Prescriptions     Pending Prescriptions Disp Refills    JARDIANCE 25 MG tablet [Pharmacy Med Name: Jardiance 25 MG Oral Tablet] 90 tablet 0     Sig: Take 1 tablet by mouth once daily       Last Visit Date (If Applicable):  8/13/2024    Next Visit Date:    2/17/2025